# Patient Record
Sex: FEMALE | Race: BLACK OR AFRICAN AMERICAN | NOT HISPANIC OR LATINO
[De-identification: names, ages, dates, MRNs, and addresses within clinical notes are randomized per-mention and may not be internally consistent; named-entity substitution may affect disease eponyms.]

---

## 2017-01-05 ENCOUNTER — APPOINTMENT (OUTPATIENT)
Dept: OBGYN | Facility: CLINIC | Age: 37
End: 2017-01-05

## 2017-01-23 ENCOUNTER — APPOINTMENT (OUTPATIENT)
Dept: OBGYN | Facility: CLINIC | Age: 37
End: 2017-01-23

## 2017-02-16 ENCOUNTER — APPOINTMENT (OUTPATIENT)
Dept: OBGYN | Facility: CLINIC | Age: 37
End: 2017-02-16

## 2017-03-28 ENCOUNTER — RESULT CHARGE (OUTPATIENT)
Age: 37
End: 2017-03-28

## 2017-03-29 ENCOUNTER — NON-APPOINTMENT (OUTPATIENT)
Age: 37
End: 2017-03-29

## 2017-03-29 ENCOUNTER — APPOINTMENT (OUTPATIENT)
Dept: INTERNAL MEDICINE | Facility: CLINIC | Age: 37
End: 2017-03-29

## 2017-03-29 VITALS — SYSTOLIC BLOOD PRESSURE: 130 MMHG | DIASTOLIC BLOOD PRESSURE: 72 MMHG | HEART RATE: 76 BPM

## 2017-03-29 DIAGNOSIS — D25.9 LEIOMYOMA OF UTERUS, UNSPECIFIED: ICD-10-CM

## 2017-03-31 ENCOUNTER — OUTPATIENT (OUTPATIENT)
Dept: OUTPATIENT SERVICES | Facility: HOSPITAL | Age: 37
LOS: 1 days | End: 2017-03-31
Payer: COMMERCIAL

## 2017-03-31 VITALS
TEMPERATURE: 98 F | HEART RATE: 75 BPM | OXYGEN SATURATION: 100 % | HEIGHT: 64 IN | WEIGHT: 189.6 LBS | SYSTOLIC BLOOD PRESSURE: 112 MMHG | RESPIRATION RATE: 12 BRPM | DIASTOLIC BLOOD PRESSURE: 86 MMHG

## 2017-03-31 DIAGNOSIS — D25.0 SUBMUCOUS LEIOMYOMA OF UTERUS: ICD-10-CM

## 2017-03-31 DIAGNOSIS — D17.1 BENIGN LIPOMATOUS NEOPLASM OF SKIN AND SUBCUTANEOUS TISSUE OF TRUNK: Chronic | ICD-10-CM

## 2017-03-31 DIAGNOSIS — Z90.13 ACQUIRED ABSENCE OF BILATERAL BREASTS AND NIPPLES: Chronic | ICD-10-CM

## 2017-03-31 DIAGNOSIS — Z30.2 ENCOUNTER FOR STERILIZATION: Chronic | ICD-10-CM

## 2017-03-31 DIAGNOSIS — Z78.9 OTHER SPECIFIED HEALTH STATUS: ICD-10-CM

## 2017-03-31 PROCEDURE — 85027 COMPLETE CBC AUTOMATED: CPT

## 2017-03-31 RX ORDER — SODIUM CHLORIDE 9 MG/ML
3 INJECTION INTRAMUSCULAR; INTRAVENOUS; SUBCUTANEOUS EVERY 8 HOURS
Qty: 0 | Refills: 0 | Status: DISCONTINUED | OUTPATIENT
Start: 2017-04-06 | End: 2017-04-21

## 2017-03-31 NOTE — H&P PST ADULT - PSH
Encounter for Essure implantation  s/p essure placement  H/O bilateral breast reduction surgery    Lipoma of back  s/p excision

## 2017-03-31 NOTE — H&P PST ADULT - NSANTHOSAYNRD_GEN_A_CORE
No. KATHERINE screening performed.  STOP BANG Legend: 0-2 = LOW Risk; 3-4 = INTERMEDIATE Risk; 5-8 = HIGH Risk

## 2017-03-31 NOTE — H&P PST ADULT - HISTORY OF PRESENT ILLNESS
35 yo female.   presents 37 yo female. . LMP3/15/2017. c/o prolonged menses (up to 2 weeks at a time) and dysmenorrhea. evaluated by GYN, diagnosed with submucous leiomyoma. presents to PST scheduled for hysteroscopic myomectomy.

## 2017-03-31 NOTE — H&P PST ADULT - PROBLEM SELECTOR PLAN 2
left message with surgeon, Dr. Singleton that pt is a Religion, refuses blood transfusion.  OR booking notified

## 2017-04-01 LAB
HCT VFR BLD CALC: 35.4 % — SIGNIFICANT CHANGE UP (ref 34.5–45)
HGB BLD-MCNC: 11.5 G/DL — SIGNIFICANT CHANGE UP (ref 11.5–15.5)
MCHC RBC-ENTMCNC: 27.1 PG — SIGNIFICANT CHANGE UP (ref 27–34)
MCHC RBC-ENTMCNC: 32.5 GM/DL — SIGNIFICANT CHANGE UP (ref 32–36)
MCV RBC AUTO: 83.3 FL — SIGNIFICANT CHANGE UP (ref 80–100)
PLATELET # BLD AUTO: 270 K/UL — SIGNIFICANT CHANGE UP (ref 150–400)
RBC # BLD: 4.25 M/UL — SIGNIFICANT CHANGE UP (ref 3.8–5.2)
RBC # FLD: 14.1 % — SIGNIFICANT CHANGE UP (ref 10.3–14.5)
WBC # BLD: 7.41 K/UL — SIGNIFICANT CHANGE UP (ref 3.8–10.5)
WBC # FLD AUTO: 7.41 K/UL — SIGNIFICANT CHANGE UP (ref 3.8–10.5)

## 2017-04-03 RX ORDER — ACETAMINOPHEN 500 MG
975 TABLET ORAL ONCE
Qty: 0 | Refills: 0 | Status: COMPLETED | OUTPATIENT
Start: 2017-04-06 | End: 2017-04-06

## 2017-04-03 RX ORDER — CELECOXIB 200 MG/1
200 CAPSULE ORAL ONCE
Qty: 0 | Refills: 0 | Status: COMPLETED | OUTPATIENT
Start: 2017-04-06 | End: 2017-04-06

## 2017-04-05 ENCOUNTER — RESULT REVIEW (OUTPATIENT)
Age: 37
End: 2017-04-05

## 2017-04-06 ENCOUNTER — OUTPATIENT (OUTPATIENT)
Dept: OUTPATIENT SERVICES | Facility: HOSPITAL | Age: 37
LOS: 1 days | End: 2017-04-06
Payer: COMMERCIAL

## 2017-04-06 ENCOUNTER — APPOINTMENT (OUTPATIENT)
Dept: OBGYN | Facility: HOSPITAL | Age: 37
End: 2017-04-06

## 2017-04-06 VITALS
SYSTOLIC BLOOD PRESSURE: 117 MMHG | DIASTOLIC BLOOD PRESSURE: 82 MMHG | OXYGEN SATURATION: 100 % | TEMPERATURE: 98 F | HEART RATE: 66 BPM | RESPIRATION RATE: 12 BRPM | HEIGHT: 64 IN | WEIGHT: 189.6 LBS

## 2017-04-06 VITALS
SYSTOLIC BLOOD PRESSURE: 113 MMHG | HEART RATE: 72 BPM | RESPIRATION RATE: 16 BRPM | OXYGEN SATURATION: 100 % | DIASTOLIC BLOOD PRESSURE: 70 MMHG

## 2017-04-06 DIAGNOSIS — Z90.13 ACQUIRED ABSENCE OF BILATERAL BREASTS AND NIPPLES: Chronic | ICD-10-CM

## 2017-04-06 DIAGNOSIS — D25.0 SUBMUCOUS LEIOMYOMA OF UTERUS: ICD-10-CM

## 2017-04-06 DIAGNOSIS — D17.1 BENIGN LIPOMATOUS NEOPLASM OF SKIN AND SUBCUTANEOUS TISSUE OF TRUNK: Chronic | ICD-10-CM

## 2017-04-06 DIAGNOSIS — Z30.2 ENCOUNTER FOR STERILIZATION: Chronic | ICD-10-CM

## 2017-04-06 PROCEDURE — 58561 HYSTEROSCOPY REMOVE MYOMA: CPT | Mod: 82

## 2017-04-06 PROCEDURE — 58561 HYSTEROSCOPY REMOVE MYOMA: CPT

## 2017-04-06 PROCEDURE — 88305 TISSUE EXAM BY PATHOLOGIST: CPT | Mod: 26

## 2017-04-06 PROCEDURE — 88305 TISSUE EXAM BY PATHOLOGIST: CPT

## 2017-04-06 RX ORDER — ONDANSETRON 8 MG/1
4 TABLET, FILM COATED ORAL ONCE
Qty: 0 | Refills: 0 | Status: DISCONTINUED | OUTPATIENT
Start: 2017-04-06 | End: 2017-04-21

## 2017-04-06 RX ORDER — SODIUM CHLORIDE 9 MG/ML
1000 INJECTION INTRAMUSCULAR; INTRAVENOUS; SUBCUTANEOUS
Qty: 0 | Refills: 0 | Status: DISCONTINUED | OUTPATIENT
Start: 2017-04-06 | End: 2017-04-21

## 2017-04-06 RX ORDER — OXYCODONE HYDROCHLORIDE 5 MG/1
5 TABLET ORAL ONCE
Qty: 0 | Refills: 0 | Status: DISCONTINUED | OUTPATIENT
Start: 2017-04-06 | End: 2017-04-06

## 2017-04-06 RX ORDER — CELECOXIB 200 MG/1
200 CAPSULE ORAL ONCE
Qty: 0 | Refills: 0 | Status: COMPLETED | OUTPATIENT
Start: 2017-04-06 | End: 2017-04-06

## 2017-04-06 RX ORDER — OXYCODONE HYDROCHLORIDE 5 MG/1
10 TABLET ORAL ONCE
Qty: 0 | Refills: 0 | Status: DISCONTINUED | OUTPATIENT
Start: 2017-04-06 | End: 2017-04-06

## 2017-04-06 RX ADMIN — CELECOXIB 200 MILLIGRAM(S): 200 CAPSULE ORAL at 15:43

## 2017-04-06 RX ADMIN — CELECOXIB 200 MILLIGRAM(S): 200 CAPSULE ORAL at 12:12

## 2017-04-06 RX ADMIN — Medication 975 MILLIGRAM(S): at 12:12

## 2017-04-06 NOTE — BRIEF OPERATIVE NOTE - PROCEDURE
Dilation and curettage  04/06/2017    Active  [email or web address]  Operative hysteroscopy with bipolar resectoscope with automated tissue fragment removal system  04/06/2017  monopolar  Active  [email or web address]

## 2017-04-07 ENCOUNTER — TRANSCRIPTION ENCOUNTER (OUTPATIENT)
Age: 37
End: 2017-04-07

## 2017-04-13 LAB — SURGICAL PATHOLOGY STUDY: SIGNIFICANT CHANGE UP

## 2017-04-24 ENCOUNTER — APPOINTMENT (OUTPATIENT)
Dept: OBGYN | Facility: CLINIC | Age: 37
End: 2017-04-24

## 2017-04-25 ENCOUNTER — APPOINTMENT (OUTPATIENT)
Dept: OBGYN | Facility: CLINIC | Age: 37
End: 2017-04-25

## 2017-07-22 ENCOUNTER — EMERGENCY (EMERGENCY)
Facility: HOSPITAL | Age: 37
LOS: 1 days | Discharge: HOME | End: 2017-07-22
Attending: EMERGENCY MEDICINE | Admitting: EMERGENCY MEDICINE
Payer: COMMERCIAL

## 2017-07-22 VITALS
HEART RATE: 86 BPM | SYSTOLIC BLOOD PRESSURE: 96 MMHG | DIASTOLIC BLOOD PRESSURE: 61 MMHG | TEMPERATURE: 100 F | RESPIRATION RATE: 20 BRPM | OXYGEN SATURATION: 100 %

## 2017-07-22 DIAGNOSIS — D17.1 BENIGN LIPOMATOUS NEOPLASM OF SKIN AND SUBCUTANEOUS TISSUE OF TRUNK: Chronic | ICD-10-CM

## 2017-07-22 DIAGNOSIS — Z30.2 ENCOUNTER FOR STERILIZATION: Chronic | ICD-10-CM

## 2017-07-22 DIAGNOSIS — Z90.13 ACQUIRED ABSENCE OF BILATERAL BREASTS AND NIPPLES: Chronic | ICD-10-CM

## 2017-07-22 LAB
ALBUMIN SERPL ELPH-MCNC: 3.5 G/DL — SIGNIFICANT CHANGE UP (ref 3.3–5)
ALP SERPL-CCNC: 78 U/L — SIGNIFICANT CHANGE UP (ref 40–120)
ALT FLD-CCNC: 115 U/L RC — HIGH (ref 10–45)
ANION GAP SERPL CALC-SCNC: 16 MMOL/L — SIGNIFICANT CHANGE UP (ref 5–17)
APTT BLD: 27 SEC — LOW (ref 27.5–37.4)
AST SERPL-CCNC: 192 U/L — HIGH (ref 10–40)
BASOPHILS # BLD AUTO: 0.1 K/UL — SIGNIFICANT CHANGE UP (ref 0–0.2)
BILIRUB SERPL-MCNC: 0.4 MG/DL — SIGNIFICANT CHANGE UP (ref 0.2–1.2)
BUN SERPL-MCNC: 11 MG/DL — SIGNIFICANT CHANGE UP (ref 7–23)
CALCIUM SERPL-MCNC: 9 MG/DL — SIGNIFICANT CHANGE UP (ref 8.4–10.5)
CHLORIDE SERPL-SCNC: 103 MMOL/L — SIGNIFICANT CHANGE UP (ref 96–108)
CO2 SERPL-SCNC: 21 MMOL/L — LOW (ref 22–31)
CREAT SERPL-MCNC: 0.82 MG/DL — SIGNIFICANT CHANGE UP (ref 0.5–1.3)
EOSINOPHIL # BLD AUTO: 0 K/UL — SIGNIFICANT CHANGE UP (ref 0–0.5)
GLUCOSE SERPL-MCNC: 134 MG/DL — HIGH (ref 70–99)
HCT VFR BLD CALC: 34.9 % — SIGNIFICANT CHANGE UP (ref 34.5–45)
HGB BLD-MCNC: 11.8 G/DL — SIGNIFICANT CHANGE UP (ref 11.5–15.5)
INR BLD: 1.03 RATIO — SIGNIFICANT CHANGE UP (ref 0.88–1.16)
LIDOCAIN IGE QN: 28 U/L — SIGNIFICANT CHANGE UP (ref 7–60)
LYMPHOCYTES # BLD AUTO: 0.6 K/UL — LOW (ref 1–3.3)
LYMPHOCYTES # BLD AUTO: 6 % — LOW (ref 13–44)
MAGNESIUM SERPL-MCNC: 1.8 MG/DL — SIGNIFICANT CHANGE UP (ref 1.6–2.6)
MCHC RBC-ENTMCNC: 28.7 PG — SIGNIFICANT CHANGE UP (ref 27–34)
MCHC RBC-ENTMCNC: 33.7 GM/DL — SIGNIFICANT CHANGE UP (ref 32–36)
MCV RBC AUTO: 85.2 FL — SIGNIFICANT CHANGE UP (ref 80–100)
MONOCYTES # BLD AUTO: 0.4 K/UL — SIGNIFICANT CHANGE UP (ref 0–0.9)
MONOCYTES NFR BLD AUTO: 4 % — SIGNIFICANT CHANGE UP (ref 2–14)
NEUTROPHILS # BLD AUTO: 10.6 K/UL — HIGH (ref 1.8–7.4)
NEUTROPHILS NFR BLD AUTO: 87 % — HIGH (ref 43–77)
PHOSPHATE SERPL-MCNC: 1.8 MG/DL — LOW (ref 2.5–4.5)
PLATELET # BLD AUTO: 197 K/UL — SIGNIFICANT CHANGE UP (ref 150–400)
POTASSIUM SERPL-MCNC: 4.4 MMOL/L — SIGNIFICANT CHANGE UP (ref 3.5–5.3)
POTASSIUM SERPL-SCNC: 4.4 MMOL/L — SIGNIFICANT CHANGE UP (ref 3.5–5.3)
PROT SERPL-MCNC: 7.4 G/DL — SIGNIFICANT CHANGE UP (ref 6–8.3)
PROTHROM AB SERPL-ACNC: 11.2 SEC — SIGNIFICANT CHANGE UP (ref 9.8–12.7)
RBC # BLD: 4.09 M/UL — SIGNIFICANT CHANGE UP (ref 3.8–5.2)
RBC # FLD: 13.4 % — SIGNIFICANT CHANGE UP (ref 10.3–14.5)
SODIUM SERPL-SCNC: 140 MMOL/L — SIGNIFICANT CHANGE UP (ref 135–145)
WBC # BLD: 11.7 K/UL — HIGH (ref 3.8–10.5)
WBC # FLD AUTO: 11.7 K/UL — HIGH (ref 3.8–10.5)

## 2017-07-22 PROCEDURE — 71010: CPT | Mod: 26

## 2017-07-22 PROCEDURE — 99285 EMERGENCY DEPT VISIT HI MDM: CPT

## 2017-07-22 RX ORDER — MORPHINE SULFATE 50 MG/1
4 CAPSULE, EXTENDED RELEASE ORAL ONCE
Qty: 0 | Refills: 0 | Status: DISCONTINUED | OUTPATIENT
Start: 2017-07-22 | End: 2017-07-22

## 2017-07-22 RX ORDER — FAMOTIDINE 10 MG/ML
20 INJECTION INTRAVENOUS ONCE
Qty: 0 | Refills: 0 | Status: COMPLETED | OUTPATIENT
Start: 2017-07-22 | End: 2017-07-22

## 2017-07-22 RX ORDER — SUCRALFATE 1 G
1 TABLET ORAL ONCE
Qty: 0 | Refills: 0 | Status: COMPLETED | OUTPATIENT
Start: 2017-07-22 | End: 2017-07-22

## 2017-07-22 RX ORDER — ACETAMINOPHEN 500 MG
1000 TABLET ORAL ONCE
Qty: 0 | Refills: 0 | Status: COMPLETED | OUTPATIENT
Start: 2017-07-22 | End: 2017-07-22

## 2017-07-22 RX ORDER — ONDANSETRON 8 MG/1
4 TABLET, FILM COATED ORAL ONCE
Qty: 0 | Refills: 0 | Status: COMPLETED | OUTPATIENT
Start: 2017-07-22 | End: 2017-07-22

## 2017-07-22 RX ORDER — SODIUM CHLORIDE 9 MG/ML
1000 INJECTION INTRAMUSCULAR; INTRAVENOUS; SUBCUTANEOUS ONCE
Qty: 0 | Refills: 0 | Status: COMPLETED | OUTPATIENT
Start: 2017-07-22 | End: 2017-07-22

## 2017-07-22 RX ADMIN — Medication 400 MILLIGRAM(S): at 22:45

## 2017-07-22 RX ADMIN — Medication 1000 MILLIGRAM(S): at 23:50

## 2017-07-22 RX ADMIN — SODIUM CHLORIDE 2000 MILLILITER(S): 9 INJECTION INTRAMUSCULAR; INTRAVENOUS; SUBCUTANEOUS at 22:45

## 2017-07-22 RX ADMIN — Medication 30 MILLILITER(S): at 23:34

## 2017-07-22 RX ADMIN — FAMOTIDINE 20 MILLIGRAM(S): 10 INJECTION INTRAVENOUS at 23:34

## 2017-07-22 RX ADMIN — ONDANSETRON 4 MILLIGRAM(S): 8 TABLET, FILM COATED ORAL at 23:34

## 2017-07-22 RX ADMIN — MORPHINE SULFATE 4 MILLIGRAM(S): 50 CAPSULE, EXTENDED RELEASE ORAL at 23:39

## 2017-07-22 NOTE — ED PROVIDER NOTE - MEDICAL DECISION MAKING DETAILS
Anirudh resident: 37 y/o female with p./w epigastric pain - sudden onset - hx of ulcers many years ago - non drinker - low suspicion for pancreatitis - low suspicion for cholecystitis - FOBT negative - will check labs, CT a/p, urine, and reassess

## 2017-07-22 NOTE — ED PROVIDER NOTE - PROGRESS NOTE DETAILS
patient reports feeling better - CT negative for acute pathology other than fibroids - Us negative for acute anna or cholelithiasis - patient feels well - safe to d/c home [place] FAROOQ: Patient reassessed and results of imaging discussed with her. Patient reassured and reports improvement of pain. Patient to follow up with her PMD in 3 days.

## 2017-07-22 NOTE — ED PROVIDER NOTE - CARE PLAN
Principal Discharge DX:	Unspecified abdominal pain  Instructions for follow-up, activity and diet:	1) Please return to the ED should you have any new or worsening symptoms, worsening pain, develop fever, chills, worsening pain or any concerning symptoms  2) Please follow up with your primary care doctor in 2-3 days.

## 2017-07-22 NOTE — ED PROVIDER NOTE - EYES, MLM
Clear bilaterally, pupils equal, round and reactive to light. No scleral icterus or conjunctival pallor.

## 2017-07-22 NOTE — ED PROVIDER NOTE - GASTROINTESTINAL, MLM
Abdomen soft,, tenderness in epigastric region w/ no guarding. No cva tenderness. Rectal Exam w/ small non bleeding hemorrhoid - no stool in rectal vault w/ minimal mucous, Guaiac negative Chaperone: Kaitlin Tech

## 2017-07-22 NOTE — ED PROVIDER NOTE - CONSTITUTIONAL, MLM
normal... Well appearing, well nourished, awake, alert, oriented to person, place, time/situation and in moderate distress

## 2017-07-22 NOTE — ED ADULT NURSE NOTE - OBJECTIVE STATEMENT
0020 pt 36y f aox3 presented w/ abd pain w/ n/v states had same s/sx last year dx w/ gastritis, has hx ulcer dse, pt pending ct scan, drinking at this time pending labs/ct/dispo/gcruz

## 2017-07-22 NOTE — ED ADULT NURSE NOTE - CARDIO ASSESSMENT
VACCINE ADMINISTRATION RECORD  PARENT / GUARDIAN APPROVAL  Date: 2022  Vaccine administered to: Lynelle Cooks     : 2024    MRN: [id number]    A copy of the appropriate Centers for Disease Control and Prevention Vaccine Information statement has been provided. I have read or have had explained the information about the diseases and the vaccines listed below. There was an opportunity to ask questions and any questions were answered satisfactorily. I believe that I understand the benefits and risks of the vaccine cited and ask that the vaccine(s) listed below be given to me or to the person named above (for whom I am authorized to make this request). VACCINES ADMINISTERED:  Pediarix  , HIB  , Prevnar   and Rotarix     I have read and hereby agree to be bound by the terms of this agreement as stated above. My signature is valid until revoked by me in writing. This document is signed by , relationship: Mother on 2022.:                                                                                                                                         Parent / Joni Dry                                                Date    Lashell Loera served as a witness to authentication that the identity of the person signing electronically is in fact the person represented as signing. This document was generated by Lashell Loera on 2022. WDL

## 2017-07-22 NOTE — ED PROVIDER NOTE - OBJECTIVE STATEMENT
35 y/o female with no PMH on Phentermine for weight loss p/w abdominal pain since Thursday. Per patient, has had epigastric abdominal pain since Thursday. Pain is getting worse. Patient also reports chills. Describes pain sharp, and radiating through to her back. Hx of ulcer 10 years ago diagnosed by endoscopy. States has had pain like this in the past, last episode 1 year ago - diagnosed with gastritis. Not on any active acid suppression - unknown hx of H. pylori. Been taking pepcid with mild relief. No EtOH use. No new meds. Denies any vomiting or diarrhea, but is nauseous. Also has a hx of constipation - last BM today was small pellets. Reports bright red blood on top of stool - been present for past few days. LMP end of June.   PMD: Shira Eaton

## 2017-07-22 NOTE — ED PROVIDER NOTE - PLAN OF CARE
1) Please return to the ED should you have any new or worsening symptoms, worsening pain, develop fever, chills, worsening pain or any concerning symptoms  2) Please follow up with your primary care doctor in 2-3 days.

## 2017-07-23 VITALS
TEMPERATURE: 98 F | HEART RATE: 81 BPM | RESPIRATION RATE: 18 BRPM | OXYGEN SATURATION: 99 % | SYSTOLIC BLOOD PRESSURE: 100 MMHG | DIASTOLIC BLOOD PRESSURE: 65 MMHG

## 2017-07-23 LAB
APPEARANCE UR: ABNORMAL
BACTERIA # UR AUTO: ABNORMAL /HPF
BILIRUB UR-MCNC: NEGATIVE — SIGNIFICANT CHANGE UP
COLOR SPEC: YELLOW — SIGNIFICANT CHANGE UP
DIFF PNL FLD: NEGATIVE — SIGNIFICANT CHANGE UP
EPI CELLS # UR: SIGNIFICANT CHANGE UP /HPF
GLUCOSE UR QL: NEGATIVE — SIGNIFICANT CHANGE UP
KETONES UR-MCNC: NEGATIVE — SIGNIFICANT CHANGE UP
LEUKOCYTE ESTERASE UR-ACNC: NEGATIVE — SIGNIFICANT CHANGE UP
NITRITE UR-MCNC: NEGATIVE — SIGNIFICANT CHANGE UP
PH UR: 6.5 — SIGNIFICANT CHANGE UP (ref 5–8)
PROT UR-MCNC: SIGNIFICANT CHANGE UP
RBC CASTS # UR COMP ASSIST: SIGNIFICANT CHANGE UP /HPF (ref 0–2)
SP GR SPEC: 1.02 — SIGNIFICANT CHANGE UP (ref 1.01–1.02)
UROBILINOGEN FLD QL: NEGATIVE — SIGNIFICANT CHANGE UP
WBC UR QL: SIGNIFICANT CHANGE UP /HPF (ref 0–5)

## 2017-07-23 PROCEDURE — 83605 ASSAY OF LACTIC ACID: CPT

## 2017-07-23 PROCEDURE — 84295 ASSAY OF SERUM SODIUM: CPT

## 2017-07-23 PROCEDURE — 82330 ASSAY OF CALCIUM: CPT

## 2017-07-23 PROCEDURE — 99284 EMERGENCY DEPT VISIT MOD MDM: CPT | Mod: 25

## 2017-07-23 PROCEDURE — 71045 X-RAY EXAM CHEST 1 VIEW: CPT

## 2017-07-23 PROCEDURE — 80053 COMPREHEN METABOLIC PANEL: CPT

## 2017-07-23 PROCEDURE — 84100 ASSAY OF PHOSPHORUS: CPT

## 2017-07-23 PROCEDURE — 82803 BLOOD GASES ANY COMBINATION: CPT

## 2017-07-23 PROCEDURE — 83690 ASSAY OF LIPASE: CPT

## 2017-07-23 PROCEDURE — 83735 ASSAY OF MAGNESIUM: CPT

## 2017-07-23 PROCEDURE — 82272 OCCULT BLD FECES 1-3 TESTS: CPT

## 2017-07-23 PROCEDURE — 76705 ECHO EXAM OF ABDOMEN: CPT | Mod: 26

## 2017-07-23 PROCEDURE — 96374 THER/PROPH/DIAG INJ IV PUSH: CPT | Mod: XU

## 2017-07-23 PROCEDURE — 84132 ASSAY OF SERUM POTASSIUM: CPT

## 2017-07-23 PROCEDURE — 82947 ASSAY GLUCOSE BLOOD QUANT: CPT

## 2017-07-23 PROCEDURE — 82435 ASSAY OF BLOOD CHLORIDE: CPT

## 2017-07-23 PROCEDURE — 76705 ECHO EXAM OF ABDOMEN: CPT

## 2017-07-23 PROCEDURE — 74177 CT ABD & PELVIS W/CONTRAST: CPT

## 2017-07-23 PROCEDURE — 85014 HEMATOCRIT: CPT

## 2017-07-23 PROCEDURE — 85610 PROTHROMBIN TIME: CPT

## 2017-07-23 PROCEDURE — 85730 THROMBOPLASTIN TIME PARTIAL: CPT

## 2017-07-23 PROCEDURE — 81001 URINALYSIS AUTO W/SCOPE: CPT

## 2017-07-23 PROCEDURE — 74177 CT ABD & PELVIS W/CONTRAST: CPT | Mod: 26

## 2017-07-23 PROCEDURE — 96375 TX/PRO/DX INJ NEW DRUG ADDON: CPT

## 2017-07-23 PROCEDURE — 85027 COMPLETE CBC AUTOMATED: CPT

## 2017-07-23 RX ADMIN — Medication 1 GRAM(S): at 03:00

## 2017-07-23 RX ADMIN — MORPHINE SULFATE 4 MILLIGRAM(S): 50 CAPSULE, EXTENDED RELEASE ORAL at 04:29

## 2017-07-23 NOTE — ED ADULT NURSE REASSESSMENT NOTE - NS ED NURSE REASSESS COMMENT FT1
report received from IVON Dos Santos. Pt. in no acute distress, sitting in stretcher comfortably. Breathing unlabored on RA. Comfort and safety maintained.
[id number] pt went for sono at this time/pending results and final dispo/gcruz
3333 pt states pain is controlled, resting comfortably vss/pending sono/gcruz

## 2017-07-24 ENCOUNTER — TRANSCRIPTION ENCOUNTER (OUTPATIENT)
Age: 37
End: 2017-07-24

## 2017-07-31 ENCOUNTER — APPOINTMENT (OUTPATIENT)
Dept: GASTROENTEROLOGY | Facility: CLINIC | Age: 37
End: 2017-07-31
Payer: COMMERCIAL

## 2017-07-31 VITALS
TEMPERATURE: 98.9 F | HEIGHT: 63 IN | WEIGHT: 185 LBS | SYSTOLIC BLOOD PRESSURE: 120 MMHG | BODY MASS INDEX: 32.78 KG/M2 | RESPIRATION RATE: 14 BRPM | DIASTOLIC BLOOD PRESSURE: 80 MMHG | OXYGEN SATURATION: 98 % | HEART RATE: 94 BPM

## 2017-07-31 DIAGNOSIS — R79.89 OTHER SPECIFIED ABNORMAL FINDINGS OF BLOOD CHEMISTRY: ICD-10-CM

## 2017-07-31 PROCEDURE — 99214 OFFICE O/P EST MOD 30 MIN: CPT

## 2017-08-01 LAB
ALBUMIN SERPL ELPH-MCNC: 4.1 G/DL
ALP BLD-CCNC: 74 U/L
ALT SERPL-CCNC: 30 U/L
ANION GAP SERPL CALC-SCNC: 14 MMOL/L
AST SERPL-CCNC: 18 U/L
BILIRUB SERPL-MCNC: <0.2 MG/DL
BUN SERPL-MCNC: 7 MG/DL
CALCIUM SERPL-MCNC: 9.5 MG/DL
CHLORIDE SERPL-SCNC: 102 MMOL/L
CO2 SERPL-SCNC: 23 MMOL/L
CREAT SERPL-MCNC: 0.84 MG/DL
GLUCOSE SERPL-MCNC: 90 MG/DL
HAV IGG+IGM SER QL: REACTIVE
HBV SURFACE AG SER QL: NONREACTIVE
HCV AB SER QL: NONREACTIVE
HCV S/CO RATIO: 0.14 S/CO
POTASSIUM SERPL-SCNC: 4.4 MMOL/L
PROT SERPL-MCNC: 7.8 G/DL
SODIUM SERPL-SCNC: 139 MMOL/L
TTG IGA SER IA-ACNC: <5 UNITS
TTG IGA SER-ACNC: NEGATIVE
TTG IGG SER IA-ACNC: <5 UNITS
TTG IGG SER IA-ACNC: NEGATIVE

## 2017-08-02 LAB
ANA PAT FLD IF-IMP: ABNORMAL
ANA SER IF-ACNC: ABNORMAL
CERULOPLASMIN SERPL-MCNC: 39 MG/DL
ENDOMYSIUM IGA SER QL: NORMAL
ENDOMYSIUM IGA TITR SER: NORMAL
HBV E AB SER QL: NEGATIVE
HBV E AG SER QL: NEGATIVE
MITOCHONDRIA AB SER IF-ACNC: NORMAL
SMOOTH MUSCLE AB SER QL IF: NORMAL

## 2017-08-18 ENCOUNTER — FORM ENCOUNTER (OUTPATIENT)
Age: 37
End: 2017-08-18

## 2017-08-19 ENCOUNTER — APPOINTMENT (OUTPATIENT)
Dept: MRI IMAGING | Facility: CLINIC | Age: 37
End: 2017-08-19
Payer: COMMERCIAL

## 2017-08-19 ENCOUNTER — OUTPATIENT (OUTPATIENT)
Dept: OUTPATIENT SERVICES | Facility: HOSPITAL | Age: 37
LOS: 1 days | End: 2017-08-19
Payer: COMMERCIAL

## 2017-08-19 DIAGNOSIS — Z30.2 ENCOUNTER FOR STERILIZATION: Chronic | ICD-10-CM

## 2017-08-19 DIAGNOSIS — R10.13 EPIGASTRIC PAIN: ICD-10-CM

## 2017-08-19 DIAGNOSIS — D17.1 BENIGN LIPOMATOUS NEOPLASM OF SKIN AND SUBCUTANEOUS TISSUE OF TRUNK: Chronic | ICD-10-CM

## 2017-08-19 DIAGNOSIS — Z90.13 ACQUIRED ABSENCE OF BILATERAL BREASTS AND NIPPLES: Chronic | ICD-10-CM

## 2017-08-19 PROCEDURE — 74183 MRI ABD W/O CNTR FLWD CNTR: CPT

## 2017-08-19 PROCEDURE — 74183 MRI ABD W/O CNTR FLWD CNTR: CPT | Mod: 26

## 2017-09-20 ENCOUNTER — TRANSCRIPTION ENCOUNTER (OUTPATIENT)
Age: 37
End: 2017-09-20

## 2017-10-20 ENCOUNTER — APPOINTMENT (OUTPATIENT)
Dept: GASTROENTEROLOGY | Facility: AMBULATORY MEDICAL SERVICES | Age: 37
End: 2017-10-20
Payer: COMMERCIAL

## 2017-10-20 PROCEDURE — 43239 EGD BIOPSY SINGLE/MULTIPLE: CPT

## 2017-10-20 PROCEDURE — 45378 DIAGNOSTIC COLONOSCOPY: CPT

## 2017-10-25 ENCOUNTER — RESULT REVIEW (OUTPATIENT)
Age: 37
End: 2017-10-25

## 2017-10-25 DIAGNOSIS — B96.81 GASTRITIS, UNSPECIFIED, W/OUT BLEEDING: ICD-10-CM

## 2017-10-25 DIAGNOSIS — K29.70 GASTRITIS, UNSPECIFIED, W/OUT BLEEDING: ICD-10-CM

## 2017-11-25 ENCOUNTER — APPOINTMENT (OUTPATIENT)
Dept: ULTRASOUND IMAGING | Facility: CLINIC | Age: 37
End: 2017-11-25
Payer: COMMERCIAL

## 2017-11-25 ENCOUNTER — OUTPATIENT (OUTPATIENT)
Dept: OUTPATIENT SERVICES | Facility: HOSPITAL | Age: 37
LOS: 1 days | End: 2017-11-25
Payer: COMMERCIAL

## 2017-11-25 DIAGNOSIS — Z00.8 ENCOUNTER FOR OTHER GENERAL EXAMINATION: ICD-10-CM

## 2017-11-25 DIAGNOSIS — D17.1 BENIGN LIPOMATOUS NEOPLASM OF SKIN AND SUBCUTANEOUS TISSUE OF TRUNK: Chronic | ICD-10-CM

## 2017-11-25 DIAGNOSIS — Z90.13 ACQUIRED ABSENCE OF BILATERAL BREASTS AND NIPPLES: Chronic | ICD-10-CM

## 2017-11-25 DIAGNOSIS — Z30.2 ENCOUNTER FOR STERILIZATION: Chronic | ICD-10-CM

## 2017-11-25 PROCEDURE — 76856 US EXAM PELVIC COMPLETE: CPT

## 2017-11-25 PROCEDURE — 76830 TRANSVAGINAL US NON-OB: CPT | Mod: 26

## 2017-11-25 PROCEDURE — 76830 TRANSVAGINAL US NON-OB: CPT

## 2017-11-25 PROCEDURE — 76856 US EXAM PELVIC COMPLETE: CPT | Mod: 26

## 2018-01-30 ENCOUNTER — APPOINTMENT (OUTPATIENT)
Dept: OBGYN | Facility: CLINIC | Age: 38
End: 2018-01-30
Payer: COMMERCIAL

## 2018-01-30 PROCEDURE — 99214 OFFICE O/P EST MOD 30 MIN: CPT

## 2018-04-10 ENCOUNTER — APPOINTMENT (OUTPATIENT)
Dept: INTERNAL MEDICINE | Facility: CLINIC | Age: 38
End: 2018-04-10
Payer: COMMERCIAL

## 2018-04-10 ENCOUNTER — NON-APPOINTMENT (OUTPATIENT)
Age: 38
End: 2018-04-10

## 2018-04-10 DIAGNOSIS — N92.1 EXCESSIVE AND FREQUENT MENSTRUATION WITH IRREGULAR CYCLE: ICD-10-CM

## 2018-04-10 DIAGNOSIS — Z01.818 ENCOUNTER FOR OTHER PREPROCEDURAL EXAMINATION: ICD-10-CM

## 2018-04-10 PROCEDURE — 93000 ELECTROCARDIOGRAM COMPLETE: CPT

## 2018-04-10 PROCEDURE — 99214 OFFICE O/P EST MOD 30 MIN: CPT | Mod: 25

## 2018-04-10 RX ORDER — OMEPRAZOLE 20 MG/1
20 TABLET, DELAYED RELEASE ORAL
Qty: 30 | Refills: 5 | Status: DISCONTINUED | COMMUNITY
Start: 2017-07-31 | End: 2018-04-10

## 2018-04-10 RX ORDER — MELOXICAM 7.5 MG/1
7.5 TABLET ORAL
Qty: 30 | Refills: 0 | Status: DISCONTINUED | COMMUNITY
Start: 2017-09-15

## 2018-04-10 RX ORDER — NAPROXEN 375 MG/1
375 TABLET ORAL
Qty: 20 | Refills: 0 | Status: DISCONTINUED | COMMUNITY
Start: 2017-08-02

## 2018-04-10 RX ORDER — AMOXICILLIN 500 MG/1
500 CAPSULE ORAL TWICE DAILY
Qty: 40 | Refills: 0 | Status: DISCONTINUED | COMMUNITY
Start: 2017-10-25 | End: 2018-04-10

## 2018-04-10 RX ORDER — LINACLOTIDE 290 UG/1
290 CAPSULE, GELATIN COATED ORAL
Qty: 30 | Refills: 5 | Status: DISCONTINUED | COMMUNITY
Start: 2017-07-31 | End: 2018-04-10

## 2018-04-10 RX ORDER — PHENAZOPYRIDINE HYDROCHLORIDE 200 MG/1
200 TABLET ORAL
Qty: 6 | Refills: 0 | Status: DISCONTINUED | COMMUNITY
Start: 2018-03-07

## 2018-04-10 RX ORDER — OMEPRAZOLE 20 MG/1
20 CAPSULE, DELAYED RELEASE ORAL
Qty: 30 | Refills: 0 | Status: DISCONTINUED | COMMUNITY
Start: 2017-07-31

## 2018-04-10 RX ORDER — PHENDIMETRAZINE TARTRATE 35 MG/1
35 TABLET ORAL
Qty: 90 | Refills: 0 | Status: DISCONTINUED | COMMUNITY
Start: 2017-07-14

## 2018-04-10 RX ORDER — CLARITHROMYCIN 500 MG/1
500 TABLET, FILM COATED ORAL TWICE DAILY
Qty: 20 | Refills: 0 | Status: DISCONTINUED | COMMUNITY
Start: 2017-10-25 | End: 2018-04-10

## 2018-04-10 RX ORDER — NITROFURANTOIN (MONOHYDRATE/MACROCRYSTALS) 25; 75 MG/1; MG/1
100 CAPSULE ORAL
Qty: 20 | Refills: 0 | Status: DISCONTINUED | COMMUNITY
Start: 2018-03-07

## 2018-04-10 RX ORDER — OMEPRAZOLE 40 MG/1
40 CAPSULE, DELAYED RELEASE ORAL
Qty: 30 | Refills: 0 | Status: DISCONTINUED | COMMUNITY
Start: 2017-10-25 | End: 2018-04-10

## 2018-04-12 ENCOUNTER — OUTPATIENT (OUTPATIENT)
Dept: OUTPATIENT SERVICES | Facility: HOSPITAL | Age: 38
LOS: 1 days | End: 2018-04-12
Payer: COMMERCIAL

## 2018-04-12 VITALS
WEIGHT: 191.14 LBS | DIASTOLIC BLOOD PRESSURE: 79 MMHG | HEIGHT: 64 IN | RESPIRATION RATE: 15 BRPM | SYSTOLIC BLOOD PRESSURE: 110 MMHG | OXYGEN SATURATION: 99 % | TEMPERATURE: 99 F | HEART RATE: 96 BPM

## 2018-04-12 DIAGNOSIS — Z90.13 ACQUIRED ABSENCE OF BILATERAL BREASTS AND NIPPLES: Chronic | ICD-10-CM

## 2018-04-12 DIAGNOSIS — Z78.9 OTHER SPECIFIED HEALTH STATUS: ICD-10-CM

## 2018-04-12 DIAGNOSIS — D17.1 BENIGN LIPOMATOUS NEOPLASM OF SKIN AND SUBCUTANEOUS TISSUE OF TRUNK: Chronic | ICD-10-CM

## 2018-04-12 DIAGNOSIS — Z01.818 ENCOUNTER FOR OTHER PREPROCEDURAL EXAMINATION: ICD-10-CM

## 2018-04-12 DIAGNOSIS — Z98.890 OTHER SPECIFIED POSTPROCEDURAL STATES: Chronic | ICD-10-CM

## 2018-04-12 DIAGNOSIS — D25.0 SUBMUCOUS LEIOMYOMA OF UTERUS: ICD-10-CM

## 2018-04-12 DIAGNOSIS — D25.9 LEIOMYOMA OF UTERUS, UNSPECIFIED: ICD-10-CM

## 2018-04-12 DIAGNOSIS — Z30.2 ENCOUNTER FOR STERILIZATION: Chronic | ICD-10-CM

## 2018-04-12 LAB
ANION GAP SERPL CALC-SCNC: 11 MMOL/L — SIGNIFICANT CHANGE UP (ref 5–17)
BLD GP AB SCN SERPL QL: NEGATIVE — SIGNIFICANT CHANGE UP
BUN SERPL-MCNC: 6 MG/DL — LOW (ref 7–23)
CALCIUM SERPL-MCNC: 9.1 MG/DL — SIGNIFICANT CHANGE UP (ref 8.4–10.5)
CHLORIDE SERPL-SCNC: 106 MMOL/L — SIGNIFICANT CHANGE UP (ref 96–108)
CO2 SERPL-SCNC: 23 MMOL/L — SIGNIFICANT CHANGE UP (ref 22–31)
CREAT SERPL-MCNC: 0.68 MG/DL — SIGNIFICANT CHANGE UP (ref 0.5–1.3)
GLUCOSE SERPL-MCNC: 99 MG/DL — SIGNIFICANT CHANGE UP (ref 70–99)
HBA1C BLD-MCNC: 5.3 % — SIGNIFICANT CHANGE UP (ref 4–5.6)
HCT VFR BLD CALC: 28.3 % — LOW (ref 34.5–45)
HGB BLD-MCNC: 8.7 G/DL — LOW (ref 11.5–15.5)
MCHC RBC-ENTMCNC: 23.6 PG — LOW (ref 27–34)
MCHC RBC-ENTMCNC: 30.7 GM/DL — LOW (ref 32–36)
MCV RBC AUTO: 76.7 FL — LOW (ref 80–100)
PLATELET # BLD AUTO: 341 K/UL — SIGNIFICANT CHANGE UP (ref 150–400)
POTASSIUM SERPL-MCNC: 3.8 MMOL/L — SIGNIFICANT CHANGE UP (ref 3.5–5.3)
POTASSIUM SERPL-SCNC: 3.8 MMOL/L — SIGNIFICANT CHANGE UP (ref 3.5–5.3)
RBC # BLD: 3.69 M/UL — LOW (ref 3.8–5.2)
RBC # FLD: 16.2 % — HIGH (ref 10.3–14.5)
RH IG SCN BLD-IMP: POSITIVE — SIGNIFICANT CHANGE UP
SODIUM SERPL-SCNC: 140 MMOL/L — SIGNIFICANT CHANGE UP (ref 135–145)
WBC # BLD: 8.97 K/UL — SIGNIFICANT CHANGE UP (ref 3.8–10.5)
WBC # FLD AUTO: 8.97 K/UL — SIGNIFICANT CHANGE UP (ref 3.8–10.5)

## 2018-04-12 PROCEDURE — 85027 COMPLETE CBC AUTOMATED: CPT

## 2018-04-12 PROCEDURE — 86900 BLOOD TYPING SEROLOGIC ABO: CPT

## 2018-04-12 PROCEDURE — 86901 BLOOD TYPING SEROLOGIC RH(D): CPT

## 2018-04-12 PROCEDURE — 87086 URINE CULTURE/COLONY COUNT: CPT

## 2018-04-12 PROCEDURE — 80048 BASIC METABOLIC PNL TOTAL CA: CPT

## 2018-04-12 PROCEDURE — 86850 RBC ANTIBODY SCREEN: CPT

## 2018-04-12 PROCEDURE — 83036 HEMOGLOBIN GLYCOSYLATED A1C: CPT

## 2018-04-12 PROCEDURE — G0463: CPT

## 2018-04-12 RX ORDER — CEFOTETAN DISODIUM 1 G
2 VIAL (EA) INJECTION ONCE
Qty: 0 | Refills: 0 | Status: DISCONTINUED | OUTPATIENT
Start: 2018-04-19 | End: 2018-04-19

## 2018-04-12 RX ORDER — CELECOXIB 200 MG/1
200 CAPSULE ORAL ONCE
Qty: 0 | Refills: 0 | Status: COMPLETED | OUTPATIENT
Start: 2018-04-19 | End: 2018-04-19

## 2018-04-12 RX ORDER — ACETAMINOPHEN 500 MG
975 TABLET ORAL ONCE
Qty: 0 | Refills: 0 | Status: COMPLETED | OUTPATIENT
Start: 2018-04-19 | End: 2018-04-19

## 2018-04-12 RX ORDER — SODIUM CHLORIDE 9 MG/ML
3 INJECTION INTRAMUSCULAR; INTRAVENOUS; SUBCUTANEOUS EVERY 8 HOURS
Qty: 0 | Refills: 0 | Status: DISCONTINUED | OUTPATIENT
Start: 2018-04-19 | End: 2018-04-19

## 2018-04-12 RX ORDER — FAMOTIDINE 10 MG/ML
20 INJECTION INTRAVENOUS ONCE
Qty: 0 | Refills: 0 | Status: COMPLETED | OUTPATIENT
Start: 2018-04-19 | End: 2018-04-19

## 2018-04-12 RX ORDER — LIDOCAINE HCL 20 MG/ML
0.2 VIAL (ML) INJECTION ONCE
Qty: 0 | Refills: 0 | Status: DISCONTINUED | OUTPATIENT
Start: 2018-04-19 | End: 2018-04-19

## 2018-04-12 NOTE — H&P PST ADULT - PROBLEM SELECTOR PLAN 1
Scheduled laparoscopic supracervical hysterectomy, bilateral salpingectomy, possible exploratory laparotomy and cystoscopy on 4/19/2018  CBC, BMP, HgA1c, T&S, UCx sent at [facility]  Pt reports that she accepts all blood products at this time- does not want to fill out a blood refusal consent- she advised that she has not  been baptized as a JW  Pre-op instructions given to pt

## 2018-04-12 NOTE — H&P PST ADULT - NSANTHTIREDRD_ENT_A_CORE
Came in from home cc high blood sugar was 500 at 0300 and the meter registered high, generalized weakness . Pt is insulin dependent DM not taking his long acting for the past 3 days   
No

## 2018-04-12 NOTE — H&P PST ADULT - PMH
Patient is Church    Submucous leiomyoma of uterus Patient is Caodaism  not completely baptized, will accept blood products at this time  Submucous leiomyoma of uterus Lumbar disc herniation  physical therapy  Patient is [employer]ism  not completely baptized, will accept blood products at this time, patient doesn't have health care proxy  Submucous leiomyoma of uterus H pylori ulcer    Lumbar disc herniation  physical therapy  Patient is Muslim  not completely baptized, will accept blood products at this time, patient doesn't have health care proxy  Submucous leiomyoma of uterus

## 2018-04-12 NOTE — H&P PST ADULT - ATTENDING COMMENTS
Plan is for laparoscopic supracervical hysterectomy, bilateral salpingectomy, possible laparotomy, possible cystoscopy.

## 2018-04-12 NOTE — H&P PST ADULT - PSH
Encounter for Essure implantation  s/p essure placement  H/O bilateral breast reduction surgery    H/O myomectomy  hysteroscopic, 4/2017  Lipoma of back  s/p excision Encounter for Essure implantation  s/p essure placement, over 12 years ago  H/O bilateral breast reduction surgery    H/O myomectomy  hysteroscopic, 4/2017  Lipoma of back  s/p excision Encounter for Essure implantation  s/p Essure placement, over 12 years ago  H/O bilateral breast reduction surgery    H/O myomectomy  hysteroscopic, 4/2017  Lipoma of back  s/p excision

## 2018-04-12 NOTE — H&P PST ADULT - ACTIVITY
gym 3-4x/week- cardio, aerobic gym 3-4x/week- cardio, aerobic, able to participate in moderate activity

## 2018-04-12 NOTE — H&P PST ADULT - HISTORY OF PRESENT ILLNESS
35 yo female. . LMP3/15/2017. c/o prolonged menses (up to 2 weeks at a time) and dysmenorrhea. evaluated by GYN, diagnosed with submucous leiomyoma. presents to PST scheduled for hysteroscopic myomectomy. 36 y/o AA female. . LMP3/ s/p hysteroscopic myomectomy 2017 still c/o prolonged menses (up to 2 weeks at a time), and continued menorrhagia and dysmenorrhea. Re-evaluated by GYN, diagnosed with submucous leiomyomas. Presents to [facility] for a scheduled laparoscopic supracervical hysterectomy, bilateral salpingectomy, possible exploratory laparotomy and cystoscopy on 2018. 38 y/o AA female. . LMP3/ s/p hysteroscopic myomectomy 2017 still c/o prolonged menses (up to 2 weeks at a time), and continued menorrhagia and dysmenorrhea. Re-evaluated by GYN, diagnosed with submucous leiomyomas. Presents to PST for a scheduled laparoscopic supracervical hysterectomy, bilateral salpingectomy, possible exploratory laparotomy and cystoscopy on 2018.      ***Currently pt accepts all blood products- does not have JW health care proxy***

## 2018-04-12 NOTE — H&P PST ADULT - PRIMARY CARE PROVIDER
DR. Eaton  Dr. Eaton ([facility]) [phone] Dr. Shira Eaton ([facility]) [phone], appt 4/10/18

## 2018-04-13 LAB
CULTURE RESULTS: SIGNIFICANT CHANGE UP
SPECIMEN SOURCE: SIGNIFICANT CHANGE UP

## 2018-04-18 ENCOUNTER — TRANSCRIPTION ENCOUNTER (OUTPATIENT)
Age: 38
End: 2018-04-18

## 2018-04-19 ENCOUNTER — OUTPATIENT (OUTPATIENT)
Dept: INPATIENT UNIT | Facility: HOSPITAL | Age: 38
LOS: 1 days | Discharge: HOME | End: 2018-04-19
Payer: COMMERCIAL

## 2018-04-19 ENCOUNTER — RESULT REVIEW (OUTPATIENT)
Age: 38
End: 2018-04-19

## 2018-04-19 ENCOUNTER — APPOINTMENT (OUTPATIENT)
Dept: OBGYN | Facility: HOSPITAL | Age: 38
End: 2018-04-19

## 2018-04-19 VITALS
RESPIRATION RATE: 20 BRPM | SYSTOLIC BLOOD PRESSURE: 116 MMHG | TEMPERATURE: 99 F | DIASTOLIC BLOOD PRESSURE: 79 MMHG | OXYGEN SATURATION: 100 % | HEART RATE: 89 BPM | WEIGHT: 191.14 LBS | HEIGHT: 64 IN

## 2018-04-19 VITALS
HEART RATE: 80 BPM | DIASTOLIC BLOOD PRESSURE: 76 MMHG | SYSTOLIC BLOOD PRESSURE: 124 MMHG | RESPIRATION RATE: 16 BRPM | TEMPERATURE: 98 F | OXYGEN SATURATION: 100 %

## 2018-04-19 DIAGNOSIS — D25.9 LEIOMYOMA OF UTERUS, UNSPECIFIED: ICD-10-CM

## 2018-04-19 DIAGNOSIS — Z90.13 ACQUIRED ABSENCE OF BILATERAL BREASTS AND NIPPLES: Chronic | ICD-10-CM

## 2018-04-19 DIAGNOSIS — Z98.890 OTHER SPECIFIED POSTPROCEDURAL STATES: Chronic | ICD-10-CM

## 2018-04-19 DIAGNOSIS — D17.1 BENIGN LIPOMATOUS NEOPLASM OF SKIN AND SUBCUTANEOUS TISSUE OF TRUNK: Chronic | ICD-10-CM

## 2018-04-19 DIAGNOSIS — Z30.2 ENCOUNTER FOR STERILIZATION: Chronic | ICD-10-CM

## 2018-04-19 LAB
BASE EXCESS BLDV CALC-SCNC: 0.5 MMOL/L — SIGNIFICANT CHANGE UP (ref -2–2)
BLOOD GAS VENOUS - CREATININE: SIGNIFICANT CHANGE UP MG/DL (ref 0.5–1.3)
CA-I SERPL-SCNC: 1.22 MMOL/L — SIGNIFICANT CHANGE UP (ref 1.12–1.3)
CHLORIDE BLDV-SCNC: SIGNIFICANT CHANGE UP MMOL/L (ref 96–108)
GAS PNL BLDV: 138 MMOL/L — SIGNIFICANT CHANGE UP (ref 136–145)
GAS PNL BLDV: SIGNIFICANT CHANGE UP
GAS PNL BLDV: SIGNIFICANT CHANGE UP
GLUCOSE BLDC GLUCOMTR-MCNC: 91 MG/DL — SIGNIFICANT CHANGE UP (ref 70–99)
GLUCOSE BLDV-MCNC: 89 MG/DL — SIGNIFICANT CHANGE UP (ref 70–99)
HCG UR QL: NEGATIVE — SIGNIFICANT CHANGE UP
HCO3 BLDV-SCNC: 26 MMOL/L — SIGNIFICANT CHANGE UP (ref 21–29)
HCT VFR BLDA CALC: 28 % — LOW (ref 39–50)
HGB BLD CALC-MCNC: 8.9 G/DL — LOW (ref 11.5–15.5)
HOROWITZ INDEX BLDV+IHG-RTO: 0 — SIGNIFICANT CHANGE UP
LACTATE BLDV-MCNC: 1.3 MMOL/L — SIGNIFICANT CHANGE UP (ref 0.7–2)
PCO2 BLDV: 47 MMHG — SIGNIFICANT CHANGE UP (ref 35–50)
PH BLDV: 7.36 — SIGNIFICANT CHANGE UP (ref 7.35–7.45)
PO2 BLDV: 50 MMHG — HIGH (ref 25–45)
POTASSIUM BLDV-SCNC: 3.8 MMOL/L — SIGNIFICANT CHANGE UP (ref 3.5–5)
SAO2 % BLDV: 81 % — SIGNIFICANT CHANGE UP (ref 67–88)

## 2018-04-19 PROCEDURE — 82803 BLOOD GASES ANY COMBINATION: CPT

## 2018-04-19 PROCEDURE — 88307 TISSUE EXAM BY PATHOLOGIST: CPT | Mod: 26

## 2018-04-19 PROCEDURE — 88302 TISSUE EXAM BY PATHOLOGIST: CPT | Mod: 26

## 2018-04-19 PROCEDURE — 82565 ASSAY OF CREATININE: CPT

## 2018-04-19 PROCEDURE — 85014 HEMATOCRIT: CPT

## 2018-04-19 PROCEDURE — 81025 URINE PREGNANCY TEST: CPT

## 2018-04-19 PROCEDURE — 83605 ASSAY OF LACTIC ACID: CPT

## 2018-04-19 PROCEDURE — 84295 ASSAY OF SERUM SODIUM: CPT

## 2018-04-19 PROCEDURE — 82947 ASSAY GLUCOSE BLOOD QUANT: CPT

## 2018-04-19 PROCEDURE — 88307 TISSUE EXAM BY PATHOLOGIST: CPT

## 2018-04-19 PROCEDURE — 58544 LSH W/T/O UTERUS ABOVE 250 G: CPT

## 2018-04-19 PROCEDURE — 82962 GLUCOSE BLOOD TEST: CPT

## 2018-04-19 PROCEDURE — C1889: CPT

## 2018-04-19 PROCEDURE — 84132 ASSAY OF SERUM POTASSIUM: CPT

## 2018-04-19 PROCEDURE — 88302 TISSUE EXAM BY PATHOLOGIST: CPT

## 2018-04-19 PROCEDURE — 82330 ASSAY OF CALCIUM: CPT

## 2018-04-19 PROCEDURE — 82435 ASSAY OF BLOOD CHLORIDE: CPT

## 2018-04-19 RX ORDER — ACETAMINOPHEN 500 MG
650 TABLET ORAL EVERY 6 HOURS
Qty: 0 | Refills: 0 | Status: DISCONTINUED | OUTPATIENT
Start: 2018-04-19 | End: 2018-04-19

## 2018-04-19 RX ORDER — OXYCODONE HYDROCHLORIDE 5 MG/1
1 TABLET ORAL
Qty: 15 | Refills: 0
Start: 2018-04-19

## 2018-04-19 RX ORDER — IBUPROFEN 200 MG
600 TABLET ORAL EVERY 6 HOURS
Qty: 0 | Refills: 0 | Status: DISCONTINUED | OUTPATIENT
Start: 2018-04-19 | End: 2018-04-19

## 2018-04-19 RX ORDER — HYDROMORPHONE HYDROCHLORIDE 2 MG/ML
0.5 INJECTION INTRAMUSCULAR; INTRAVENOUS; SUBCUTANEOUS
Qty: 0 | Refills: 0 | Status: DISCONTINUED | OUTPATIENT
Start: 2018-04-19 | End: 2018-04-19

## 2018-04-19 RX ORDER — OXYCODONE HYDROCHLORIDE 5 MG/1
5 TABLET ORAL EVERY 4 HOURS
Qty: 0 | Refills: 0 | Status: DISCONTINUED | OUTPATIENT
Start: 2018-04-19 | End: 2018-04-19

## 2018-04-19 RX ORDER — SODIUM CHLORIDE 9 MG/ML
1000 INJECTION, SOLUTION INTRAVENOUS
Qty: 0 | Refills: 0 | Status: DISCONTINUED | OUTPATIENT
Start: 2018-04-19 | End: 2018-04-19

## 2018-04-19 RX ORDER — IBUPROFEN 200 MG
1 TABLET ORAL
Qty: 0 | Refills: 0 | COMMUNITY

## 2018-04-19 RX ORDER — ONDANSETRON 8 MG/1
4 TABLET, FILM COATED ORAL ONCE
Qty: 0 | Refills: 0 | Status: DISCONTINUED | OUTPATIENT
Start: 2018-04-19 | End: 2018-04-19

## 2018-04-19 RX ADMIN — Medication 600 MILLIGRAM(S): at 18:03

## 2018-04-19 RX ADMIN — SODIUM CHLORIDE 125 MILLILITER(S): 9 INJECTION, SOLUTION INTRAVENOUS at 16:46

## 2018-04-19 RX ADMIN — Medication 975 MILLIGRAM(S): at 11:27

## 2018-04-19 RX ADMIN — HYDROMORPHONE HYDROCHLORIDE 0.5 MILLIGRAM(S): 2 INJECTION INTRAMUSCULAR; INTRAVENOUS; SUBCUTANEOUS at 17:00

## 2018-04-19 RX ADMIN — FAMOTIDINE 20 MILLIGRAM(S): 10 INJECTION INTRAVENOUS at 11:27

## 2018-04-19 RX ADMIN — HYDROMORPHONE HYDROCHLORIDE 0.5 MILLIGRAM(S): 2 INJECTION INTRAMUSCULAR; INTRAVENOUS; SUBCUTANEOUS at 16:45

## 2018-04-19 RX ADMIN — HYDROMORPHONE HYDROCHLORIDE 0.5 MILLIGRAM(S): 2 INJECTION INTRAMUSCULAR; INTRAVENOUS; SUBCUTANEOUS at 15:50

## 2018-04-19 RX ADMIN — Medication 650 MILLIGRAM(S): at 18:03

## 2018-04-19 RX ADMIN — SODIUM CHLORIDE 3 MILLILITER(S): 9 INJECTION INTRAMUSCULAR; INTRAVENOUS; SUBCUTANEOUS at 11:30

## 2018-04-19 RX ADMIN — Medication 650 MILLIGRAM(S): at 19:00

## 2018-04-19 RX ADMIN — CELECOXIB 200 MILLIGRAM(S): 200 CAPSULE ORAL at 11:27

## 2018-04-19 RX ADMIN — Medication 600 MILLIGRAM(S): at 19:00

## 2018-04-19 RX ADMIN — HYDROMORPHONE HYDROCHLORIDE 0.5 MILLIGRAM(S): 2 INJECTION INTRAMUSCULAR; INTRAVENOUS; SUBCUTANEOUS at 16:00

## 2018-04-19 NOTE — PATIENT PROFILE ADULT. - PSH
Encounter for Essure implantation  s/p Essure placement, over 12 years ago  H/O bilateral breast reduction surgery    H/O myomectomy  hysteroscopic, 4/2017  Lipoma of back  s/p excision

## 2018-04-19 NOTE — PROGRESS NOTE ADULT - ASSESSMENT
A/P: 37y Female PMH fibroid uterus now POD 0 s/p laparoscopi supracervical hysterectomy and bilateral salpingectomy- doing well

## 2018-04-19 NOTE — ASU DISCHARGE PLAN (ADULT/PEDIATRIC). - MEDICATION SUMMARY - MEDICATIONS TO TAKE
I will START or STAY ON the medications listed below when I get home from the hospital:    oxyCODONE 5 mg oral tablet  -- 1 tab(s) by mouth every 6 hours, As Needed -for severe pain MDD:4  -- Caution federal law prohibits the transfer of this drug to any person other  than the person for whom it was prescribed.  It is very important that you take or use this exactly as directed.  Do not skip doses or discontinue unless directed by your doctor.  May cause drowsiness.  Alcohol may intensify this effect.  Use care when operating dangerous machinery.  This prescription cannot be refilled.  Using more of this medication than prescribed may cause serious breathing problems.    -- Indication: For pain    Tylenol 325 mg oral tablet  -- 2 tab(s) by mouth every 4 hours  -- Indication: For pain

## 2018-04-19 NOTE — PROGRESS NOTE ADULT - PROBLEM SELECTOR PLAN 1
1. Neuro: Analgesia PRN. acetaminophen   Tablet. 650 milliGRAM(s) Oral every 6 hours  HYDROmorphone  Injectable 0.5 milliGRAM(s) IV Push every 10 minutes PRN  ibuprofen  Tablet 600 milliGRAM(s) Oral every 6 hours  ondansetron Injectable 4 milliGRAM(s) IV Push once PRN  oxyCODONE    IR 5 milliGRAM(s) Oral every 4 hours PRN   2. Card: Monitor VS.   3. Pulm: Incentive spirometer use. Continue   4. GI: Advance to regular diet. Anti-emetics PRN.  5. : UO adequate. DTV by 11p  6. Electrolytes: LR@125cc/hr.  7. DVT ppx w/ PAS while in bed. Early ambulation, initially with assistance then as tolerated.  8. Discharge home from PACU when criteria met.   d/w GYN team.

## 2018-04-19 NOTE — PROGRESS NOTE ADULT - SUBJECTIVE AND OBJECTIVE BOX
POST-OP CHECK    Allergies: No Known Allergies    S: Pt awake and alert resting comfortably in chair. Pain controlled. Pt denies N/V, SOB, CP, palpitations. Tolerates clears.  Not OOB yet.    O:   T(C): 36.8 (04-19-18 @ 15:20), Max: 36.8 (04-19-18 @ 15:20)  HR: 85 (04-19-18 @ 17:30) (80 - 94)  BP: 128/76 (04-19-18 @ 17:30) (116/70 - 131/73)  RR: 15 (04-19-18 @ 17:00) (15 - 16)  SpO2: 98% (04-19-18 @ 17:30) (96% - 100%)  Wt(kg): --  I&O's Summary    19 Apr 2018 07:01  -  19 Apr 2018 17:51  --------------------------------------------------------  IN: 250 mL / OUT: 0 mL / NET: 250 mL        CV: S1S2, RRR  Lungs: CTA B/L  Abd: soft, appropriately tender, occasional BS x 4 quadrants  Inc: Clean/dry/intact  Ext: Neg Homans B/L    Brief Op note:  Pre-Op Diagnosis:  Abnormal uterine bleeding (AUB)  04/19/2018    Angie Bullock    Post-Op Dx:  Abnormal uterine bleeding (AUB)  04/19/2018    Angie Bullock    Procedure:   Procedure:  Laparoscopic supracervical hysterectomy with bilateral salpingectomy  04/19/2018    Active  ODETTE.      Operative Findings:  · Operative Findings	grossly normal fallopian tubes and ovaries. enlarged uterus, adenomyosis approx 12 weeks size	    Specimens/Blood Loss/IV/Output/Protocol/VTE:   Specimens/Blood Loss/IV/Output/Protocol/VTE:  · Specimens	uterus and fallopian tubes	  · Estimated Blood Loss	25 milliLiter(s)	  · IV Infusions - Crystalloids	1000	  · Urine Output	125 milliLiter(s)

## 2018-04-19 NOTE — PATIENT PROFILE ADULT. - PMH
H pylori ulcer    Lumbar disc herniation  physical therapy  Patient is Synagogue  not completely baptized, will accept blood products at this time, patient doesn't have health care proxy  Submucous leiomyoma of uterus

## 2018-04-19 NOTE — BRIEF OPERATIVE NOTE - PROCEDURE
<<-----Click on this checkbox to enter Procedure Laparoscopic supracervical hysterectomy with bilateral salpingectomy  04/19/2018    Active  ODETTE

## 2018-04-23 DIAGNOSIS — D25.0 SUBMUCOUS LEIOMYOMA OF UTERUS: ICD-10-CM

## 2018-04-23 DIAGNOSIS — N93.9 ABNORMAL UTERINE AND VAGINAL BLEEDING, UNSPECIFIED: ICD-10-CM

## 2018-04-25 LAB — SURGICAL PATHOLOGY STUDY: SIGNIFICANT CHANGE UP

## 2018-05-04 ENCOUNTER — APPOINTMENT (OUTPATIENT)
Dept: OBGYN | Facility: CLINIC | Age: 38
End: 2018-05-04
Payer: COMMERCIAL

## 2018-05-04 PROCEDURE — 99024 POSTOP FOLLOW-UP VISIT: CPT

## 2018-07-16 PROBLEM — Z78.9 OTHER SPECIFIED HEALTH STATUS: Chronic | Status: ACTIVE | Noted: 2017-03-31

## 2018-07-25 ENCOUNTER — EMERGENCY (EMERGENCY)
Facility: HOSPITAL | Age: 38
LOS: 1 days | Discharge: HOME | End: 2018-07-25
Attending: EMERGENCY MEDICINE
Payer: COMMERCIAL

## 2018-07-25 VITALS
OXYGEN SATURATION: 100 % | SYSTOLIC BLOOD PRESSURE: 146 MMHG | TEMPERATURE: 98 F | HEART RATE: 75 BPM | DIASTOLIC BLOOD PRESSURE: 93 MMHG | RESPIRATION RATE: 18 BRPM

## 2018-07-25 DIAGNOSIS — Z90.13 ACQUIRED ABSENCE OF BILATERAL BREASTS AND NIPPLES: Chronic | ICD-10-CM

## 2018-07-25 DIAGNOSIS — D17.1 BENIGN LIPOMATOUS NEOPLASM OF SKIN AND SUBCUTANEOUS TISSUE OF TRUNK: Chronic | ICD-10-CM

## 2018-07-25 DIAGNOSIS — Z30.2 ENCOUNTER FOR STERILIZATION: Chronic | ICD-10-CM

## 2018-07-25 DIAGNOSIS — Z98.890 OTHER SPECIFIED POSTPROCEDURAL STATES: Chronic | ICD-10-CM

## 2018-07-25 PROCEDURE — 99284 EMERGENCY DEPT VISIT MOD MDM: CPT | Mod: 25

## 2018-07-25 PROCEDURE — 93010 ELECTROCARDIOGRAM REPORT: CPT

## 2018-07-26 VITALS
HEART RATE: 75 BPM | DIASTOLIC BLOOD PRESSURE: 64 MMHG | TEMPERATURE: 98 F | RESPIRATION RATE: 18 BRPM | OXYGEN SATURATION: 100 % | SYSTOLIC BLOOD PRESSURE: 98 MMHG

## 2018-07-26 PROBLEM — M51.26 OTHER INTERVERTEBRAL DISC DISPLACEMENT, LUMBAR REGION: Chronic | Status: ACTIVE | Noted: 2018-04-12

## 2018-07-26 PROBLEM — D25.0 SUBMUCOUS LEIOMYOMA OF UTERUS: Chronic | Status: ACTIVE | Noted: 2017-03-31

## 2018-07-26 PROBLEM — K27.9 PEPTIC ULCER, SITE UNSPECIFIED, UNSPECIFIED AS ACUTE OR CHRONIC, WITHOUT HEMORRHAGE OR PERFORATION: Chronic | Status: ACTIVE | Noted: 2018-04-12

## 2018-07-26 LAB
ALBUMIN SERPL ELPH-MCNC: 4.5 G/DL — SIGNIFICANT CHANGE UP (ref 3.3–5)
ALP SERPL-CCNC: 80 U/L — SIGNIFICANT CHANGE UP (ref 40–120)
ALT FLD-CCNC: 17 U/L — SIGNIFICANT CHANGE UP (ref 10–45)
ANION GAP SERPL CALC-SCNC: 14 MMOL/L — SIGNIFICANT CHANGE UP (ref 5–17)
APPEARANCE UR: CLEAR — SIGNIFICANT CHANGE UP
APTT BLD: 30.4 SEC — SIGNIFICANT CHANGE UP (ref 27.5–37.4)
AST SERPL-CCNC: 18 U/L — SIGNIFICANT CHANGE UP (ref 10–40)
BASE EXCESS BLDV CALC-SCNC: 0.3 MMOL/L — SIGNIFICANT CHANGE UP (ref -2–2)
BASE EXCESS BLDV CALC-SCNC: 1.5 MMOL/L — SIGNIFICANT CHANGE UP (ref -2–2)
BASOPHILS # BLD AUTO: 0 K/UL — SIGNIFICANT CHANGE UP (ref 0–0.2)
BILIRUB SERPL-MCNC: 0.2 MG/DL — SIGNIFICANT CHANGE UP (ref 0.2–1.2)
BILIRUB UR-MCNC: NEGATIVE — SIGNIFICANT CHANGE UP
BUN SERPL-MCNC: 8 MG/DL — SIGNIFICANT CHANGE UP (ref 7–23)
CA-I SERPL-SCNC: 1.18 MMOL/L — SIGNIFICANT CHANGE UP (ref 1.12–1.3)
CA-I SERPL-SCNC: 1.19 MMOL/L — SIGNIFICANT CHANGE UP (ref 1.12–1.3)
CALCIUM SERPL-MCNC: 9.5 MG/DL — SIGNIFICANT CHANGE UP (ref 8.4–10.5)
CHLORIDE BLDV-SCNC: 106 MMOL/L — SIGNIFICANT CHANGE UP (ref 96–108)
CHLORIDE BLDV-SCNC: 110 MMOL/L — HIGH (ref 96–108)
CHLORIDE SERPL-SCNC: 100 MMOL/L — SIGNIFICANT CHANGE UP (ref 96–108)
CO2 BLDV-SCNC: 26 MMOL/L — SIGNIFICANT CHANGE UP (ref 22–30)
CO2 BLDV-SCNC: 28 MMOL/L — SIGNIFICANT CHANGE UP (ref 22–30)
CO2 SERPL-SCNC: 25 MMOL/L — SIGNIFICANT CHANGE UP (ref 22–31)
COLOR SPEC: YELLOW — SIGNIFICANT CHANGE UP
CREAT SERPL-MCNC: 0.76 MG/DL — SIGNIFICANT CHANGE UP (ref 0.5–1.3)
DIFF PNL FLD: NEGATIVE — SIGNIFICANT CHANGE UP
EOSINOPHIL # BLD AUTO: 0 K/UL — SIGNIFICANT CHANGE UP (ref 0–0.5)
GAS PNL BLDV: 135 MMOL/L — LOW (ref 136–145)
GAS PNL BLDV: 136 MMOL/L — SIGNIFICANT CHANGE UP (ref 136–145)
GAS PNL BLDV: SIGNIFICANT CHANGE UP
GLUCOSE BLDV-MCNC: 107 MG/DL — HIGH (ref 70–99)
GLUCOSE BLDV-MCNC: 117 MG/DL — HIGH (ref 70–99)
GLUCOSE SERPL-MCNC: 120 MG/DL — HIGH (ref 70–99)
GLUCOSE UR QL: NEGATIVE — SIGNIFICANT CHANGE UP
HCO3 BLDV-SCNC: 25 MMOL/L — SIGNIFICANT CHANGE UP (ref 21–29)
HCO3 BLDV-SCNC: 27 MMOL/L — SIGNIFICANT CHANGE UP (ref 21–29)
HCT VFR BLD CALC: 38.6 % — SIGNIFICANT CHANGE UP (ref 34.5–45)
HCT VFR BLDA CALC: 31 % — LOW (ref 39–50)
HCT VFR BLDA CALC: 32 % — LOW (ref 39–50)
HGB BLD CALC-MCNC: 10.4 G/DL — LOW (ref 11.5–15.5)
HGB BLD CALC-MCNC: 9.9 G/DL — LOW (ref 11.5–15.5)
HGB BLD-MCNC: 11.3 G/DL — LOW (ref 11.5–15.5)
INR BLD: 1.08 RATIO — SIGNIFICANT CHANGE UP (ref 0.88–1.16)
KETONES UR-MCNC: NEGATIVE — SIGNIFICANT CHANGE UP
LACTATE BLDV-MCNC: 1.7 MMOL/L — SIGNIFICANT CHANGE UP (ref 0.7–2)
LACTATE BLDV-MCNC: 2.2 MMOL/L — HIGH (ref 0.7–2)
LEUKOCYTE ESTERASE UR-ACNC: NEGATIVE — SIGNIFICANT CHANGE UP
LYMPHOCYTES # BLD AUTO: 1.6 K/UL — SIGNIFICANT CHANGE UP (ref 1–3.3)
LYMPHOCYTES # BLD AUTO: 13 % — SIGNIFICANT CHANGE UP (ref 13–44)
MCHC RBC-ENTMCNC: 21.2 PG — LOW (ref 27–34)
MCHC RBC-ENTMCNC: 29.3 GM/DL — LOW (ref 32–36)
MCV RBC AUTO: 72.2 FL — LOW (ref 80–100)
MONOCYTES # BLD AUTO: 0.3 K/UL — SIGNIFICANT CHANGE UP (ref 0–0.9)
MONOCYTES NFR BLD AUTO: 4 % — SIGNIFICANT CHANGE UP (ref 2–14)
NEUTROPHILS # BLD AUTO: 7 K/UL — SIGNIFICANT CHANGE UP (ref 1.8–7.4)
NEUTROPHILS NFR BLD AUTO: 82 % — HIGH (ref 43–77)
NITRITE UR-MCNC: NEGATIVE — SIGNIFICANT CHANGE UP
PCO2 BLDV: 43 MMHG — SIGNIFICANT CHANGE UP (ref 35–50)
PCO2 BLDV: 49 MMHG — SIGNIFICANT CHANGE UP (ref 35–50)
PH BLDV: 7.36 — SIGNIFICANT CHANGE UP (ref 7.35–7.45)
PH BLDV: 7.38 — SIGNIFICANT CHANGE UP (ref 7.35–7.45)
PH UR: 6 — SIGNIFICANT CHANGE UP (ref 5–8)
PLAT MORPH BLD: NORMAL — SIGNIFICANT CHANGE UP
PLATELET # BLD AUTO: 297 K/UL — SIGNIFICANT CHANGE UP (ref 150–400)
PO2 BLDV: 26 MMHG — SIGNIFICANT CHANGE UP (ref 25–45)
PO2 BLDV: 41 MMHG — SIGNIFICANT CHANGE UP (ref 25–45)
POTASSIUM BLDV-SCNC: 3.5 MMOL/L — SIGNIFICANT CHANGE UP (ref 3.5–5.3)
POTASSIUM BLDV-SCNC: 3.6 MMOL/L — SIGNIFICANT CHANGE UP (ref 3.5–5.3)
POTASSIUM SERPL-MCNC: 3.6 MMOL/L — SIGNIFICANT CHANGE UP (ref 3.5–5.3)
POTASSIUM SERPL-SCNC: 3.6 MMOL/L — SIGNIFICANT CHANGE UP (ref 3.5–5.3)
PROT SERPL-MCNC: 8.3 G/DL — SIGNIFICANT CHANGE UP (ref 6–8.3)
PROT UR-MCNC: NEGATIVE — SIGNIFICANT CHANGE UP
PROTHROM AB SERPL-ACNC: 11.7 SEC — SIGNIFICANT CHANGE UP (ref 9.8–12.7)
RBC # BLD: 5.35 M/UL — HIGH (ref 3.8–5.2)
RBC # FLD: 18.6 % — HIGH (ref 10.3–14.5)
RBC BLD AUTO: SIGNIFICANT CHANGE UP
SAO2 % BLDV: 36 % — LOW (ref 67–88)
SAO2 % BLDV: 71 % — SIGNIFICANT CHANGE UP (ref 67–88)
SODIUM SERPL-SCNC: 139 MMOL/L — SIGNIFICANT CHANGE UP (ref 135–145)
SP GR SPEC: 1.01 — LOW (ref 1.01–1.02)
UROBILINOGEN FLD QL: NEGATIVE — SIGNIFICANT CHANGE UP
VARIANT LYMPHS # BLD: 1 % — SIGNIFICANT CHANGE UP (ref 0–6)
WBC # BLD: 8.9 K/UL — SIGNIFICANT CHANGE UP (ref 3.8–10.5)
WBC # FLD AUTO: 8.9 K/UL — SIGNIFICANT CHANGE UP (ref 3.8–10.5)

## 2018-07-26 PROCEDURE — 80053 COMPREHEN METABOLIC PANEL: CPT

## 2018-07-26 PROCEDURE — 87086 URINE CULTURE/COLONY COUNT: CPT

## 2018-07-26 PROCEDURE — 82803 BLOOD GASES ANY COMBINATION: CPT

## 2018-07-26 PROCEDURE — 82947 ASSAY GLUCOSE BLOOD QUANT: CPT

## 2018-07-26 PROCEDURE — 85027 COMPLETE CBC AUTOMATED: CPT

## 2018-07-26 PROCEDURE — 74177 CT ABD & PELVIS W/CONTRAST: CPT

## 2018-07-26 PROCEDURE — 99284 EMERGENCY DEPT VISIT MOD MDM: CPT | Mod: 25

## 2018-07-26 PROCEDURE — 96374 THER/PROPH/DIAG INJ IV PUSH: CPT | Mod: XU

## 2018-07-26 PROCEDURE — 82435 ASSAY OF BLOOD CHLORIDE: CPT

## 2018-07-26 PROCEDURE — 96375 TX/PRO/DX INJ NEW DRUG ADDON: CPT

## 2018-07-26 PROCEDURE — 81003 URINALYSIS AUTO W/O SCOPE: CPT

## 2018-07-26 PROCEDURE — 84295 ASSAY OF SERUM SODIUM: CPT

## 2018-07-26 PROCEDURE — 82330 ASSAY OF CALCIUM: CPT

## 2018-07-26 PROCEDURE — 85730 THROMBOPLASTIN TIME PARTIAL: CPT

## 2018-07-26 PROCEDURE — 85014 HEMATOCRIT: CPT

## 2018-07-26 PROCEDURE — 83605 ASSAY OF LACTIC ACID: CPT

## 2018-07-26 PROCEDURE — 84132 ASSAY OF SERUM POTASSIUM: CPT

## 2018-07-26 PROCEDURE — 74177 CT ABD & PELVIS W/CONTRAST: CPT | Mod: 26

## 2018-07-26 PROCEDURE — 85610 PROTHROMBIN TIME: CPT

## 2018-07-26 PROCEDURE — 93005 ELECTROCARDIOGRAM TRACING: CPT

## 2018-07-26 RX ORDER — METRONIDAZOLE 500 MG
1 TABLET ORAL
Qty: 30 | Refills: 0
Start: 2018-07-26 | End: 2018-08-04

## 2018-07-26 RX ORDER — ONDANSETRON 8 MG/1
4 TABLET, FILM COATED ORAL ONCE
Qty: 0 | Refills: 0 | Status: COMPLETED | OUTPATIENT
Start: 2018-07-26 | End: 2018-07-26

## 2018-07-26 RX ORDER — METRONIDAZOLE 500 MG
500 TABLET ORAL ONCE
Qty: 0 | Refills: 0 | Status: COMPLETED | OUTPATIENT
Start: 2018-07-26 | End: 2018-07-26

## 2018-07-26 RX ORDER — FAMOTIDINE 10 MG/ML
20 INJECTION INTRAVENOUS ONCE
Qty: 0 | Refills: 0 | Status: COMPLETED | OUTPATIENT
Start: 2018-07-26 | End: 2018-07-26

## 2018-07-26 RX ORDER — MOXIFLOXACIN HYDROCHLORIDE TABLETS, 400 MG 400 MG/1
1 TABLET, FILM COATED ORAL
Qty: 20 | Refills: 0
Start: 2018-07-26 | End: 2018-08-04

## 2018-07-26 RX ORDER — SODIUM CHLORIDE 9 MG/ML
1000 INJECTION, SOLUTION INTRAVENOUS ONCE
Qty: 0 | Refills: 0 | Status: COMPLETED | OUTPATIENT
Start: 2018-07-26 | End: 2018-07-26

## 2018-07-26 RX ORDER — ACETAMINOPHEN 500 MG
1000 TABLET ORAL ONCE
Qty: 0 | Refills: 0 | Status: COMPLETED | OUTPATIENT
Start: 2018-07-26 | End: 2018-07-26

## 2018-07-26 RX ORDER — CIPROFLOXACIN LACTATE 400MG/40ML
500 VIAL (ML) INTRAVENOUS ONCE
Qty: 0 | Refills: 0 | Status: COMPLETED | OUTPATIENT
Start: 2018-07-26 | End: 2018-07-26

## 2018-07-26 RX ADMIN — ONDANSETRON 4 MILLIGRAM(S): 8 TABLET, FILM COATED ORAL at 01:11

## 2018-07-26 RX ADMIN — Medication 400 MILLIGRAM(S): at 03:37

## 2018-07-26 RX ADMIN — Medication 500 MILLIGRAM(S): at 04:15

## 2018-07-26 RX ADMIN — Medication 30 MILLILITER(S): at 01:11

## 2018-07-26 RX ADMIN — FAMOTIDINE 20 MILLIGRAM(S): 10 INJECTION INTRAVENOUS at 01:11

## 2018-07-26 RX ADMIN — SODIUM CHLORIDE 1000 MILLILITER(S): 9 INJECTION, SOLUTION INTRAVENOUS at 01:11

## 2018-07-26 RX ADMIN — SODIUM CHLORIDE 1000 MILLILITER(S): 9 INJECTION, SOLUTION INTRAVENOUS at 03:38

## 2018-07-26 NOTE — ED PROVIDER NOTE - PHYSICAL EXAMINATION
GEN APPEARANCE: WDWN, alert and cooperative, non-toxic appearing and in NAD; appears uncomfortable   HEAD: Atraumatic, normocephalic   EYES: PERRLa, EOMI, vision grossly intact.   EARS: Gross hearing intact.   NOSE: No nasal discharge, no external evidence of epistaxis.   THROAT: MMM. Oral cavity and pharynx normal. No inflammation, no swelling, no exudate, no oral lesions.  NECK: Supple  CV: RRR, S1S2, no c/r/m/g. No cyanosis or pallor. Extremities warm, well perfused. Cap refill <2 seconds. No bruits.   LUNGS: CTAB. No wheezing. No rales. No rhonchi. No diminished breath sounds.   ABDOMEN: supra pubic ttp, non surgical abdomen. No guarding or rebound. No masses.   MSK: Spine appears normal, no spine point tenderness. No CVA ttp. No joint erythema or tenderness. Normal muscular development. Pelvis stable.  EXTREMITIES: No peripheral edema. No obvious joint or bony deformity.  NEURO: Alert, follows commands. Weight bearing normal. Speech normal. Sensation and motor normal x4 extremities.   SKIN: Normal color for race, warm, dry and intact. No evidence of rash.  PSYCH: Normal mood and affect.

## 2018-07-26 NOTE — ED ADULT NURSE REASSESSMENT NOTE - NS ED NURSE REASSESS COMMENT FT1
Pt a&o x3, resting on stretcher, vital signs stable, medicated as per order. Repeat VBG sent to lab. Awaiting results/dispo.

## 2018-07-26 NOTE — ED ADULT NURSE NOTE - OBJECTIVE STATEMENT
36 y/o female, a&o x3, c/o suprapubic abd pn x1 day, radiating to left flank. Denies weakness, dizziness, fevers, chills, or chest pain. Breathing even, full, unlabored, no cough, no SOB. Abdomen soft, nondistended, tender of suprapubic region, bowel sounds x4 quadrants, positive nausea, no vomiting, one episode of diarrhea, no constipation, last BM today, no dysuria, no hematuria, no urinary frequency. Skin warm/dry/intact. Stretcher locked in low position. Advised of plan of care. Family at bedside.

## 2018-07-26 NOTE — ED ADULT NURSE REASSESSMENT NOTE - NS ED NURSE REASSESS COMMENT FT1
Pt reports feeling well enough to return home, with no questions. Pt a&o x3, resting on stretcher, in no apparent distress. Denies headache, weakness, dizziness, SOB, or chest pain. Okay to d/c as per MD.

## 2018-07-26 NOTE — ED PROVIDER NOTE - ATTENDING CONTRIBUTION TO CARE
36y/o F with h/o uterine fibroids s/p hysterectomy, presenting with suprapubic abdominal pain, radiating to left flank, 1 episode of bright red blood mixed with brown stools yesterday, and intermittent constipation/diarrhea.  No fever/chills.  No vomiting.      On exam, patient is well appearing in NAD, VSS, afebrile.  MMM.  Eyes anicteric, no conjunctival pallor.  Lungs CTABL.  Cardiac nrl s1s2 rrr no mgr.  Abdomen has mild suprapubic and left lower quadrant tenderness on my exam.  No visible rash.  No peripheral edema.  No palpable hernias (femoral or inguinal).  Pelvic exam deferred given s/p hysterectomy.      Given symptoms of abdominal pain (lower), diarrhea/constipation and other symptoms, concern for diverticulitis/colitis or other bowel pathology raised.  Labs, CT AP and (given suprapubic pain radiating to left flank) UA sent: DDx also includes UTI/cystitis/pyelonephritis, nephro/uretero-lithiasis.  Very unlikely traumatic injury or retroperitoneal bleeding given lack of trauma in history.  Very unlikely AAA or aortic rupture given no history of aortic pathology presenting pain/symptoms not c/w this.    Labs obtained, no significant anemia, leukocytosis or platelet abnormality on CBC.  CMP non-actionable.  CT AP obtained, and showing questionable sigmoid colitis.  initial vbg notable for lactate 2.2, showed HCT 32; repeat vbg after 2 L iv crystalloid infusion showed normalized lactate and no significant decrease in HCT.  Patient symptomatically improved, tolerating po.  No evidence on UA to suggest pyelo/infection or blood to suggest stone, and no renal pathology on CT; no aortic pathology noted on ct; likely mild colitis, will dc with antibiotics (cipro/flagyl).  Results of labs and CT d/w patient and instructed patient on importance of f/u with her PCP and with a gastroenterologist for further evaluation (likely to include colonoscopy as outpatient after completion of antibiotics).

## 2018-07-26 NOTE — ED PROVIDER NOTE - OBJECTIVE STATEMENT
37F hx of hysterectomy 2/2 fibroids, colitis 2/2 h.pylori presents with a cc of suprapubic abdominal pain x1 day, a/w intermittent radiation to L flank, also notes x1 episode of BRBPR 2/2 diarrhea after bearing down from being constipated, also c/o nausea, no vomiting, 37F hx of hysterectomy 2/2 fibroids, colitis 2/2 h.pylori presents with a cc of suprapubic abdominal pain x1 day, a/w intermittent radiation to L flank, also notes x1 episode of BRBPR 2/2 diarrhea after bearing down from being constipated, also c/o nausea, no vomiting, still passing gas - has had similar episode in past, feels like when had colitis, took x2 Excedrin at home w/o relief. No vaginal discharge. Denies /f/c/cp/sob. Denies headache, syncope, lightheadedness, dizziness. Denies chest palpitations, abdominal pain. Denies dysuria, hematuria, tarry stools, constipation.

## 2018-07-26 NOTE — ED PROVIDER NOTE - PLAN OF CARE
Thank you for visiting our Emergency Department, it has been a pleasure taking part in your healthcare.    Your discharge diagnosis is: colitis  Please take all discharge medications as indicated below:  Cipro 500mg twice a day x10 days  Flagyl 500mg three times a day x10 days  Please follow up with your PMD within x48 hours.  Please follow up with your Gastroenterologist (GI) within x48 hours.  A copy of resulted labs, imaging, and findings have been provided to you.   You have had a detailed discussion with your provider regarding your diagnosis, care management and discharge planning including, but not limited to: return precautions, follow up visits with existing or new providers, new prescriptions and/or medication changes, wound and/or spint/cast care or other care   aspects specific to your diagnosis and treatment. You have been given the opportunity to have your questions answered. At this time you have been deemed stable and fit for discharge.  Return precautions to the Emergency Department include but are not limited to: unrelenting nausea, vomiting, fever, chills, chest pain, shortness of breath, dizziness, chest or abdominal pain, worsening back pain, syncope, blood in urine or stool, headache that doesn't resolve, numbness or tingling, loss of sensation, loss of motor function, or any other concerning symptoms.

## 2018-07-26 NOTE — ED PROVIDER NOTE - PROGRESS NOTE DETAILS
Pt assessed at beside. Pt resting comfortably, pain controlled, pt questions answered. Vital signs stable. Discussed findings of CT scan suggestive of colitis - pt tolerating  PO meds in ED; asking to be d/c'd will d/c w/ pmd/gi f/u strict return precautions

## 2018-07-26 NOTE — ED PROVIDER NOTE - PMH
H pylori ulcer    Lumbar disc herniation  physical therapy  Patient is Hoahaoism  not completely baptized, will accept blood products at this time, patient doesn't have health care proxy  Submucous leiomyoma of uterus

## 2018-07-26 NOTE — ED PROVIDER NOTE - CARE PLAN
Principal Discharge DX:	Colitis  Assessment and plan of treatment:	Thank you for visiting our Emergency Department, it has been a pleasure taking part in your healthcare.    Your discharge diagnosis is: colitis  Please take all discharge medications as indicated below:  Cipro 500mg twice a day x10 days  Flagyl 500mg three times a day x10 days  Please follow up with your PMD within x48 hours.  Please follow up with your Gastroenterologist (GI) within x48 hours.  A copy of resulted labs, imaging, and findings have been provided to you.   You have had a detailed discussion with your provider regarding your diagnosis, care management and discharge planning including, but not limited to: return precautions, follow up visits with existing or new providers, new prescriptions and/or medication changes, wound and/or spint/cast care or other care   aspects specific to your diagnosis and treatment. You have been given the opportunity to have your questions answered. At this time you have been deemed stable and fit for discharge.  Return precautions to the Emergency Department include but are not limited to: unrelenting nausea, vomiting, fever, chills, chest pain, shortness of breath, dizziness, chest or abdominal pain, worsening back pain, syncope, blood in urine or stool, headache that doesn't resolve, numbness or tingling, loss of sensation, loss of motor function, or any other concerning symptoms.

## 2018-07-26 NOTE — ED ADULT NURSE NOTE - PMH
H pylori ulcer    Lumbar disc herniation  physical therapy  Patient is Jew  not completely baptized, will accept blood products at this time, patient doesn't have health care proxy  Submucous leiomyoma of uterus

## 2018-07-26 NOTE — ED PROVIDER NOTE - MEDICAL DECISION MAKING DETAILS
Kristal PGY2: 37F hx of hysterectomy 2/2 fibroids, colitis 2/2 h.pylori presents with a cc of suprapubic abdominal pain x1 day, a/w intermittent radiation to L flank, also notes x1 episode of BRBPR 2/2 diarrhea after bearing down from being constipated, also c/o nausea, no vomiting, still passing gas exam vss non toxic appears uncomfortable abdmn w/ suprapubic ttp ddx uti vs renal stone vs colitis will check labs ua ctap ivf zofran gi cocktail reassess

## 2018-07-27 LAB
CULTURE RESULTS: SIGNIFICANT CHANGE UP
SPECIMEN SOURCE: SIGNIFICANT CHANGE UP

## 2018-07-28 NOTE — ED POST DISCHARGE NOTE - DETAILS
7/28/18: Attempted to contact patient regarding ucx contamination results. No answer. Left voice message with callback number to PA admin line. - Dawit Gill PA-C 7/28/18 at 11:41AM: Attempted to contact patient regarding ucx contamination results. No answer. Left voice message with callback number to PA admin line. - Dawit Gill PA-C 7/29/18: M for patient to call back - Joyce Garrett PA-C

## 2018-07-28 NOTE — ED POST DISCHARGE NOTE - OTHER COMMUNICATION
Patient returned call to ED. Informed of results. Patient asymptomatic informed of results and encouraged to have repeat testing if onset of symptoms - Joyce Garrett PA-C

## 2019-04-24 ENCOUNTER — APPOINTMENT (OUTPATIENT)
Dept: OBGYN | Facility: CLINIC | Age: 39
End: 2019-04-24
Payer: COMMERCIAL

## 2019-04-24 ENCOUNTER — RESULT REVIEW (OUTPATIENT)
Age: 39
End: 2019-04-24

## 2019-04-24 PROCEDURE — 99396 PREV VISIT EST AGE 40-64: CPT

## 2020-02-19 ENCOUNTER — APPOINTMENT (OUTPATIENT)
Dept: GASTROENTEROLOGY | Facility: CLINIC | Age: 40
End: 2020-02-19
Payer: COMMERCIAL

## 2020-02-19 VITALS
WEIGHT: 184 LBS | HEIGHT: 63 IN | SYSTOLIC BLOOD PRESSURE: 106 MMHG | RESPIRATION RATE: 15 BRPM | BODY MASS INDEX: 32.6 KG/M2 | OXYGEN SATURATION: 98 % | HEART RATE: 90 BPM | DIASTOLIC BLOOD PRESSURE: 68 MMHG

## 2020-02-19 PROCEDURE — 99213 OFFICE O/P EST LOW 20 MIN: CPT

## 2020-02-19 NOTE — PHYSICAL EXAM
[General Appearance - Alert] : alert [General Appearance - In No Acute Distress] : in no acute distress [Sclera] : the sclera and conjunctiva were normal [Outer Ear] : the ears and nose were normal in appearance [Auscultation Breath Sounds / Voice Sounds] : lungs were clear to auscultation bilaterally [Heart Rate And Rhythm] : heart rate was normal and rhythm regular [Heart Sounds] : normal S1 and S2 [Heart Sounds Gallop] : no gallops [Heart Sounds Pericardial Friction Rub] : no pericardial rub [Murmurs] : no murmurs [Bowel Sounds] : normal bowel sounds [Edema] : there was no peripheral edema [Abdomen Soft] : soft [Abdomen Mass (___ Cm)] : no abdominal mass palpated [Abdomen Tenderness] : non-tender [] : no hepato-splenomegaly [Skin Color & Pigmentation] : normal skin color and pigmentation [No Focal Deficits] : no focal deficits

## 2020-02-19 NOTE — HISTORY OF PRESENT ILLNESS
[FreeTextEntry1] : Gaby present for a followup visit. The past few weeks she has been experiencing epigastric abdominal pain or bleeding to the right side on her back. This is associated with nausea without vomiting. No fevers or chills. She relates that on Monday she had severe abdominal pain and the pain usually lasts approximately 6 hours. On Monday when she had the pain was associated with one diarrheal stool. She denies rectal bleeding or melena. She denies weight loss. No specific foods appear to aggravate her GI symptoms. She continues with chronic constipation in that she has pellet-sized stools with bowel movements every 2-3 days. She tried Linzess in the past without response. She has a history of H. pylori gastritis treated with amoxicillin, clarithromycin and PPI therapy. When I first over in 2017 she had abnormal liver enzymes thought to be secondary to her diet pills. Followup blood work was normal. MRI with MRCP was unremarkable.

## 2020-02-19 NOTE — ASSESSMENT
[FreeTextEntry1] : This is a 39-year-old female with epigastric abdominal pain radiating to the back. I recommend blood work today including liver enzymes, amylase and lipase to rule out a biliary cause of her pain. I recommend an abdominal sonogram. I recommend stool for H. pylori antigen to verify eradication. While waiting for results, I recommend trial of omeprazole 20 mg daily. If the blood work and sonogram are unremarkable and she continues to have abdominal pain, consideration be given to an upper endoscopy.

## 2020-02-20 LAB
ALBUMIN SERPL ELPH-MCNC: 4.2 G/DL
ALP BLD-CCNC: 75 U/L
ALT SERPL-CCNC: 15 U/L
AMYLASE/CREAT SERPL: 51 U/L
ANION GAP SERPL CALC-SCNC: 13 MMOL/L
AST SERPL-CCNC: 20 U/L
BASOPHILS # BLD AUTO: 0.02 K/UL
BASOPHILS NFR BLD AUTO: 0.3 %
BILIRUB SERPL-MCNC: <0.2 MG/DL
BUN SERPL-MCNC: 10 MG/DL
CALCIUM SERPL-MCNC: 9.7 MG/DL
CHLORIDE SERPL-SCNC: 108 MMOL/L
CO2 SERPL-SCNC: 22 MMOL/L
CREAT SERPL-MCNC: 0.71 MG/DL
EOSINOPHIL # BLD AUTO: 0.09 K/UL
EOSINOPHIL NFR BLD AUTO: 1.5 %
GLUCOSE SERPL-MCNC: 93 MG/DL
HCT VFR BLD CALC: 39.9 %
HGB BLD-MCNC: 12.5 G/DL
IMM GRANULOCYTES NFR BLD AUTO: 0.3 %
LPL SERPL-CCNC: 27 U/L
LYMPHOCYTES # BLD AUTO: 2.88 K/UL
LYMPHOCYTES NFR BLD AUTO: 46.9 %
MAN DIFF?: NORMAL
MCHC RBC-ENTMCNC: 28 PG
MCHC RBC-ENTMCNC: 31.3 GM/DL
MCV RBC AUTO: 89.3 FL
MONOCYTES # BLD AUTO: 0.48 K/UL
MONOCYTES NFR BLD AUTO: 7.8 %
NEUTROPHILS # BLD AUTO: 2.65 K/UL
NEUTROPHILS NFR BLD AUTO: 43.2 %
PLATELET # BLD AUTO: 252 K/UL
POTASSIUM SERPL-SCNC: 4.4 MMOL/L
PROT SERPL-MCNC: 7.5 G/DL
RBC # BLD: 4.47 M/UL
RBC # FLD: 14 %
SODIUM SERPL-SCNC: 143 MMOL/L
WBC # FLD AUTO: 6.14 K/UL

## 2020-02-28 ENCOUNTER — TRANSCRIPTION ENCOUNTER (OUTPATIENT)
Age: 40
End: 2020-02-28

## 2020-04-02 DIAGNOSIS — U07.1 COVID-19: ICD-10-CM

## 2020-04-04 ENCOUNTER — APPOINTMENT (OUTPATIENT)
Dept: DISASTER EMERGENCY | Facility: CLINIC | Age: 40
End: 2020-04-04

## 2020-05-07 ENCOUNTER — APPOINTMENT (OUTPATIENT)
Dept: GASTROENTEROLOGY | Facility: CLINIC | Age: 40
End: 2020-05-07
Payer: COMMERCIAL

## 2020-05-07 ENCOUNTER — APPOINTMENT (OUTPATIENT)
Dept: GASTROENTEROLOGY | Facility: CLINIC | Age: 40
End: 2020-05-07

## 2020-05-07 DIAGNOSIS — R19.7 DIARRHEA, UNSPECIFIED: ICD-10-CM

## 2020-05-07 PROCEDURE — 99213 OFFICE O/P EST LOW 20 MIN: CPT | Mod: 95

## 2020-05-07 NOTE — HISTORY OF PRESENT ILLNESS
[Home] : at home, [unfilled] , at the time of the visit. [Other Location: e.g. Home (Enter Location, City,State)___] : at [unfilled] [Patient] : the patient [FreeTextEntry2] : Gaby Ding [FreeTextEntry1] : Gaby Presents for a telehealth visit. For the past 1-1/2 weeks she has been having diarrhea only after eating consisting of liquid stool without bleeding. She has gas and bloating. No nausea or vomiting. No fevers or chills. No one else is sick at home. She denies any use of recent antibiotics. She denies any new medications. She still on omeprazole 20 mg daily for epigastric abdominal pain that she had seen before in February. I recommend stopping the omeprazole. She no longer has epigastric abdominal pain or heartburn symptoms. She relates that she tested positive for the covid virus Possibly one month ago. Her symptoms consisted of fevers and coughing. She no longer has these symptoms.

## 2020-05-07 NOTE — ASSESSMENT
[FreeTextEntry1] : This is a 39-year-old female with recent diagnosis of COVID 19 infection reporting 1-1/2 weeks of nonbloody diarrhea. I'm not sure if this is related to the COVID virus or viral gastroenteritis. I recommend stool GI PCR and C diff PCR. She is to hydrate well by increasing her water intake. She can take Imodium as needed. For now she is to avoid dairy products. She is to stop omeprazole as it could possibly cause side effects of diarrhea.She is to call me if she has questions or concerns.

## 2020-05-13 LAB
C DIFF TOX GENS STL QL NAA+PROBE: NORMAL
CDIFF BY PCR: NOT DETECTED
GI PCR PANEL, STOOL: NORMAL

## 2020-09-25 ENCOUNTER — EMERGENCY (EMERGENCY)
Facility: HOSPITAL | Age: 40
LOS: 1 days | Discharge: HOME | End: 2020-09-25
Attending: EMERGENCY MEDICINE
Payer: COMMERCIAL

## 2020-09-25 VITALS
DIASTOLIC BLOOD PRESSURE: 84 MMHG | RESPIRATION RATE: 16 BRPM | TEMPERATURE: 98 F | OXYGEN SATURATION: 100 % | HEART RATE: 70 BPM | SYSTOLIC BLOOD PRESSURE: 112 MMHG

## 2020-09-25 VITALS
HEIGHT: 64 IN | SYSTOLIC BLOOD PRESSURE: 113 MMHG | OXYGEN SATURATION: 99 % | DIASTOLIC BLOOD PRESSURE: 77 MMHG | RESPIRATION RATE: 18 BRPM | HEART RATE: 79 BPM | TEMPERATURE: 99 F

## 2020-09-25 DIAGNOSIS — Z98.890 OTHER SPECIFIED POSTPROCEDURAL STATES: Chronic | ICD-10-CM

## 2020-09-25 DIAGNOSIS — Z90.13 ACQUIRED ABSENCE OF BILATERAL BREASTS AND NIPPLES: Chronic | ICD-10-CM

## 2020-09-25 DIAGNOSIS — Z30.2 ENCOUNTER FOR STERILIZATION: Chronic | ICD-10-CM

## 2020-09-25 DIAGNOSIS — D17.1 BENIGN LIPOMATOUS NEOPLASM OF SKIN AND SUBCUTANEOUS TISSUE OF TRUNK: Chronic | ICD-10-CM

## 2020-09-25 LAB
ALBUMIN SERPL ELPH-MCNC: 4.1 G/DL — SIGNIFICANT CHANGE UP (ref 3.3–5)
ALP SERPL-CCNC: 74 U/L — SIGNIFICANT CHANGE UP (ref 40–120)
ALT FLD-CCNC: 8 U/L — LOW (ref 10–45)
ANION GAP SERPL CALC-SCNC: 10 MMOL/L — SIGNIFICANT CHANGE UP (ref 5–17)
APPEARANCE UR: CLEAR — SIGNIFICANT CHANGE UP
APTT BLD: 33.5 SEC — SIGNIFICANT CHANGE UP (ref 27.5–35.5)
AST SERPL-CCNC: 16 U/L — SIGNIFICANT CHANGE UP (ref 10–40)
BACTERIA # UR AUTO: NEGATIVE — SIGNIFICANT CHANGE UP
BASOPHILS # BLD AUTO: 0.01 K/UL — SIGNIFICANT CHANGE UP (ref 0–0.2)
BASOPHILS NFR BLD AUTO: 0.2 % — SIGNIFICANT CHANGE UP (ref 0–2)
BILIRUB SERPL-MCNC: 0.1 MG/DL — LOW (ref 0.2–1.2)
BILIRUB UR-MCNC: NEGATIVE — SIGNIFICANT CHANGE UP
BUN SERPL-MCNC: 10 MG/DL — SIGNIFICANT CHANGE UP (ref 7–23)
CALCIUM SERPL-MCNC: 9.7 MG/DL — SIGNIFICANT CHANGE UP (ref 8.4–10.5)
CHLORIDE SERPL-SCNC: 105 MMOL/L — SIGNIFICANT CHANGE UP (ref 96–108)
CO2 SERPL-SCNC: 23 MMOL/L — SIGNIFICANT CHANGE UP (ref 22–31)
COLOR SPEC: COLORLESS — SIGNIFICANT CHANGE UP
CREAT SERPL-MCNC: 0.67 MG/DL — SIGNIFICANT CHANGE UP (ref 0.5–1.3)
DIFF PNL FLD: NEGATIVE — SIGNIFICANT CHANGE UP
EOSINOPHIL # BLD AUTO: 0.04 K/UL — SIGNIFICANT CHANGE UP (ref 0–0.5)
EOSINOPHIL NFR BLD AUTO: 0.6 % — SIGNIFICANT CHANGE UP (ref 0–6)
EPI CELLS # UR: 1 /HPF — SIGNIFICANT CHANGE UP
GLUCOSE SERPL-MCNC: 97 MG/DL — SIGNIFICANT CHANGE UP (ref 70–99)
GLUCOSE UR QL: NEGATIVE — SIGNIFICANT CHANGE UP
HCT VFR BLD CALC: 37.2 % — SIGNIFICANT CHANGE UP (ref 34.5–45)
HGB BLD-MCNC: 12.3 G/DL — SIGNIFICANT CHANGE UP (ref 11.5–15.5)
IMM GRANULOCYTES NFR BLD AUTO: 0.2 % — SIGNIFICANT CHANGE UP (ref 0–1.5)
INR BLD: 0.97 RATIO — SIGNIFICANT CHANGE UP (ref 0.88–1.16)
KETONES UR-MCNC: NEGATIVE — SIGNIFICANT CHANGE UP
LEUKOCYTE ESTERASE UR-ACNC: NEGATIVE — SIGNIFICANT CHANGE UP
LYMPHOCYTES # BLD AUTO: 3.15 K/UL — SIGNIFICANT CHANGE UP (ref 1–3.3)
LYMPHOCYTES # BLD AUTO: 50.2 % — HIGH (ref 13–44)
MCHC RBC-ENTMCNC: 28.3 PG — SIGNIFICANT CHANGE UP (ref 27–34)
MCHC RBC-ENTMCNC: 33.1 GM/DL — SIGNIFICANT CHANGE UP (ref 32–36)
MCV RBC AUTO: 85.7 FL — SIGNIFICANT CHANGE UP (ref 80–100)
MONOCYTES # BLD AUTO: 0.32 K/UL — SIGNIFICANT CHANGE UP (ref 0–0.9)
MONOCYTES NFR BLD AUTO: 5.1 % — SIGNIFICANT CHANGE UP (ref 2–14)
NEUTROPHILS # BLD AUTO: 2.74 K/UL — SIGNIFICANT CHANGE UP (ref 1.8–7.4)
NEUTROPHILS NFR BLD AUTO: 43.7 % — SIGNIFICANT CHANGE UP (ref 43–77)
NITRITE UR-MCNC: NEGATIVE — SIGNIFICANT CHANGE UP
NRBC # BLD: 0 /100 WBCS — SIGNIFICANT CHANGE UP (ref 0–0)
PH UR: 6 — SIGNIFICANT CHANGE UP (ref 5–8)
PLATELET # BLD AUTO: 241 K/UL — SIGNIFICANT CHANGE UP (ref 150–400)
POTASSIUM SERPL-MCNC: 3.4 MMOL/L — LOW (ref 3.5–5.3)
POTASSIUM SERPL-SCNC: 3.4 MMOL/L — LOW (ref 3.5–5.3)
PROT SERPL-MCNC: 7.3 G/DL — SIGNIFICANT CHANGE UP (ref 6–8.3)
PROT UR-MCNC: NEGATIVE — SIGNIFICANT CHANGE UP
PROTHROM AB SERPL-ACNC: 11.6 SEC — SIGNIFICANT CHANGE UP (ref 10.6–13.6)
RBC # BLD: 4.34 M/UL — SIGNIFICANT CHANGE UP (ref 3.8–5.2)
RBC # FLD: 12.8 % — SIGNIFICANT CHANGE UP (ref 10.3–14.5)
RBC CASTS # UR COMP ASSIST: 0 /HPF — SIGNIFICANT CHANGE UP (ref 0–4)
SODIUM SERPL-SCNC: 138 MMOL/L — SIGNIFICANT CHANGE UP (ref 135–145)
SP GR SPEC: 1.01 — SIGNIFICANT CHANGE UP (ref 1.01–1.02)
UROBILINOGEN FLD QL: NEGATIVE — SIGNIFICANT CHANGE UP
WBC # BLD: 6.27 K/UL — SIGNIFICANT CHANGE UP (ref 3.8–10.5)
WBC # FLD AUTO: 6.27 K/UL — SIGNIFICANT CHANGE UP (ref 3.8–10.5)
WBC UR QL: 0 /HPF — SIGNIFICANT CHANGE UP (ref 0–5)

## 2020-09-25 PROCEDURE — 85730 THROMBOPLASTIN TIME PARTIAL: CPT

## 2020-09-25 PROCEDURE — 96375 TX/PRO/DX INJ NEW DRUG ADDON: CPT

## 2020-09-25 PROCEDURE — 85025 COMPLETE CBC W/AUTO DIFF WBC: CPT

## 2020-09-25 PROCEDURE — 87086 URINE CULTURE/COLONY COUNT: CPT

## 2020-09-25 PROCEDURE — 99284 EMERGENCY DEPT VISIT MOD MDM: CPT | Mod: 25

## 2020-09-25 PROCEDURE — 99284 EMERGENCY DEPT VISIT MOD MDM: CPT

## 2020-09-25 PROCEDURE — 80053 COMPREHEN METABOLIC PANEL: CPT

## 2020-09-25 PROCEDURE — 83690 ASSAY OF LIPASE: CPT

## 2020-09-25 PROCEDURE — 81001 URINALYSIS AUTO W/SCOPE: CPT

## 2020-09-25 PROCEDURE — 85610 PROTHROMBIN TIME: CPT

## 2020-09-25 PROCEDURE — 96374 THER/PROPH/DIAG INJ IV PUSH: CPT

## 2020-09-25 RX ORDER — SODIUM CHLORIDE 9 MG/ML
1000 INJECTION INTRAMUSCULAR; INTRAVENOUS; SUBCUTANEOUS ONCE
Refills: 0 | Status: COMPLETED | OUTPATIENT
Start: 2020-09-25 | End: 2020-09-25

## 2020-09-25 RX ORDER — PANTOPRAZOLE SODIUM 20 MG/1
1 TABLET, DELAYED RELEASE ORAL
Qty: 14 | Refills: 0
Start: 2020-09-25 | End: 2020-10-01

## 2020-09-25 RX ORDER — MORPHINE SULFATE 50 MG/1
4 CAPSULE, EXTENDED RELEASE ORAL ONCE
Refills: 0 | Status: DISCONTINUED | OUTPATIENT
Start: 2020-09-25 | End: 2020-09-25

## 2020-09-25 RX ORDER — FAMOTIDINE 10 MG/ML
20 INJECTION INTRAVENOUS ONCE
Refills: 0 | Status: COMPLETED | OUTPATIENT
Start: 2020-09-25 | End: 2020-09-25

## 2020-09-25 RX ORDER — ONDANSETRON 8 MG/1
4 TABLET, FILM COATED ORAL ONCE
Refills: 0 | Status: COMPLETED | OUTPATIENT
Start: 2020-09-25 | End: 2020-09-25

## 2020-09-25 RX ADMIN — MORPHINE SULFATE 4 MILLIGRAM(S): 50 CAPSULE, EXTENDED RELEASE ORAL at 20:39

## 2020-09-25 RX ADMIN — SODIUM CHLORIDE 1000 MILLILITER(S): 9 INJECTION INTRAMUSCULAR; INTRAVENOUS; SUBCUTANEOUS at 19:08

## 2020-09-25 RX ADMIN — ONDANSETRON 4 MILLIGRAM(S): 8 TABLET, FILM COATED ORAL at 19:08

## 2020-09-25 RX ADMIN — FAMOTIDINE 20 MILLIGRAM(S): 10 INJECTION INTRAVENOUS at 19:08

## 2020-09-25 RX ADMIN — Medication 30 MILLILITER(S): at 19:08

## 2020-09-25 RX ADMIN — MORPHINE SULFATE 4 MILLIGRAM(S): 50 CAPSULE, EXTENDED RELEASE ORAL at 19:08

## 2020-09-25 NOTE — ED PROVIDER NOTE - PHYSICAL EXAMINATION
PGY3/MD Shauna.  VITALS: reviewed  GEN: No apparent distress, A & O x 4  HEAD/EYES: NC/AT, PERRL, EOMI, anicteric sclerae, no conjunctival pallor  ENT: mucus membranes moist, oropharynx WNL, trachea midline, no JVD, neck is supple  RESP: lungs CTA with equal breath sounds bilaterally, chest wall nontender and atraumatic  CV: heart with reg rhythm S1, S2, no murmur; distal pulses intact and symmetric bilaterally  ABDOMEN: normoactive bowel sounds, soft, nondistended, nontender + old vertical surgical scar under umbilicas  MSK: extremities atraumatic and nontender, no edema, no asymmetry.   SKIN: warm, dry, no rash, no bruising, no cyanosis. color appropriate for ethnicity  NEURO: alert, mentating appropriately, no facial asymmetry.  PSYCH: Affect appropriate

## 2020-09-25 NOTE — ED PROVIDER NOTE - NSFOLLOWUPINSTRUCTIONS_ED_ALL_ED_FT
Thank you for visiting our Emergency Department, it has been a pleasure taking part in your healthcare.    You had a thorough evaluation including an exam, labs and imaging. You were given medications for comfort. Your workup did not demonstrate any concerning findings. This does not mean that your symptoms are not real, only that we were unable to find a dangerous or life-threatening cause. Please read the attached information sheets as they will provide useful information regarding your condition.    Your discharge diagnosis is: epigastric pain  Please take all discharge medications as indicated below:   Return precautions to the Emergency Department include but are not limited to: worsening pain despite medications, persistent nausea/vomiting (can't keep water down at all) fevers, unrelenting nausea, vomiting, shortness of breath, dizziness, chest or abdominal pain, worsening back pain, syncope, blood in urine, stool, or vomit, headache that doesn't resolve, numbness or tingling, loss of sensation, loss of motor function, or any other concerning symptoms.    1) Follow up with your primary care doctor within 48 hours. Please call [phone] to make an appointment or with any questions you may have.  or call [phone] to make an appointment with the clinic  2) You should also establish care with Gastroenterology by calling  [phone] to find a doctor affiliated with [facility] in your neighborhood & network.   3) Take over the counter Pepcid 20 mg every 12 hrs. Take maalox 2 tablespoons (30ml) as needed for epigastric discomfort. Take simethicone (GasX) every 6 hours as needed for gas pain, cramping, burping or flatulance.  4) Drink at least 2 Liters or 64 Ounces of water each day.  5) Read all attached.

## 2020-09-25 NOTE — ED PROVIDER NOTE - PROGRESS NOTE DETAILS
PGY3/MD Shauna. resolved pain, PO challenge successful. likely GI origin, out pt GI follow up. I have given the pt strict return and follow up precautions. The patient has been provided with a copy of all pertinent results. The patient has been informed of all concerning signs and symptoms to return to Emergency Department, the necessity to follow up with PMD/Clinic/follow up provided within 2-3 days was explained, and the patient reports understanding of above with capacity and insight.

## 2020-09-25 NOTE — ED PROVIDER NOTE - OBJECTIVE STATEMENT
PGY3/MD Shauna. 39 yo F with h/o PU, PSH of , p/w epigastric pain, x1d. On-off, but progressively worsening, 8/10, radiation to the back, denies association with food, denies black stool. Once vomit but no blood. Denies h/o HA or family h/o CAD. no fever, cough. Pt had COVID last April, no admission. Has GI doctorVanita not seen this year yet.

## 2020-09-25 NOTE — ED PROVIDER NOTE - CARE PROVIDER_API CALL
Daniela Trinidad  GASTROENTEROLOGY  [address] [place] [address] [address]  [place] [address]  Phone: [phone]  Fax: [phone]  Follow Up Time: 1-3 Days

## 2020-09-25 NOTE — ED PROVIDER NOTE - PMH
H pylori ulcer    Lumbar disc herniation  physical therapy  Patient is Orthodoxy  not completely baptized, will accept blood products at this time, patient doesn't have health care proxy  Submucous leiomyoma of uterus

## 2020-09-25 NOTE — ED PROVIDER NOTE - PATIENT PORTAL LINK FT
You can access the FollowMyHealth Patient Portal offered by [facility] by registering at the following website: [email or web address]. By joining [employer]’s FollowMyHealth portal, you will also be able to view your health information using other applications (apps) compatible with our system.

## 2020-09-25 NOTE — ED PROVIDER NOTE - CLINICAL SUMMARY MEDICAL DECISION MAKING FREE TEXT BOX
PGY3/MD Shauna. 39 yo F with h/o PU, , p/w epigastric pain, on-off, significantly worsen today but self limited, on my PE, not surgical abdomen, soft and non distension, will give her pain meds, fluid, reassess, if the pain is resolved, likely d/c and GI follow up. PGY3/MD Shauna. 39 yo F with h/o PU, , p/w epigastric pain, on-off, significantly worsen today but self limited, on my PE, not surgical abdomen, soft and non distension, will give her pain meds, fluid, reassess, if the pain is resolved, likely d/c and GI follow up.  Yamileth: 39 yo with hx of ulcer. presents with epigastric pain since noon. very uncomfortable. +nausea. on exam very little tenderness. no bleeding. will treat as gastritis. also check for pancreatitis.

## 2020-09-25 NOTE — ED ADULT NURSE NOTE - OBJECTIVE STATEMENT
39y/o female presented to the ED from home with complaint of abdominal pain. A&Ox3, ambulatory. PMH- GERD, hysterectomy. Patient reports acute onset of epigastric pain radiating to back since 12 PM this afternoon. Patient states that pain is constant burning and aching. Patient states that she was been unable to eat due to pain. Reports nausea no vomiting. Denies black/bloody stool. denies urinary symptoms. Denies chest pain, SOB, fever, chills, dizziness, syncope.    Patient reports feeling depressed within the last two weeks. Reports feeling depressed since losing her father to COVID in april. Patient states that she has support system at home, not currently meeting with psychologist. Denies SI/ HI at this time.

## 2020-09-25 NOTE — ED PROVIDER NOTE - ATTENDING CONTRIBUTION TO CARE
I performed a history and physical exam of the patient and discussed their management with the resident and /or advanced care provider. I reviewed the resident and /or ACP's note and agree with the documented findings and plan of care. My medical decision making and observations are found above.  Lungs clear, abd slight epigastric pain.

## 2020-09-26 LAB
CULTURE RESULTS: SIGNIFICANT CHANGE UP
SPECIMEN SOURCE: SIGNIFICANT CHANGE UP

## 2020-10-05 ENCOUNTER — APPOINTMENT (OUTPATIENT)
Dept: GASTROENTEROLOGY | Facility: CLINIC | Age: 40
End: 2020-10-05
Payer: COMMERCIAL

## 2020-10-05 VITALS
OXYGEN SATURATION: 98 % | BODY MASS INDEX: 32.78 KG/M2 | RESPIRATION RATE: 16 BRPM | SYSTOLIC BLOOD PRESSURE: 90 MMHG | HEART RATE: 106 BPM | HEIGHT: 63 IN | TEMPERATURE: 98.4 F | DIASTOLIC BLOOD PRESSURE: 56 MMHG | WEIGHT: 185 LBS

## 2020-10-05 DIAGNOSIS — R10.13 EPIGASTRIC PAIN: ICD-10-CM

## 2020-10-05 PROCEDURE — 99213 OFFICE O/P EST LOW 20 MIN: CPT

## 2020-10-05 NOTE — ASSESSMENT
[FreeTextEntry1] : This is a 40 yo female with epigastric abdominal pain. 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 I recommend eating small meals she most likely has gastritis. I recommend going back on omeprazole 20 mg half hour before breakfast and famotidine 40 mg at night. If no response after 2 weeks, she is to give me a call at which time I will get an official abdominal sonogram and schedule an upper endoscopy. Multiple questions were answered. She stated understanding.

## 2020-10-05 NOTE — HISTORY OF PRESENT ILLNESS
[FreeTextEntry1] : Gaby Presents for a followup visit. She relates that in September 25 she woke up with a bandlike abdominal pain starting in the epigastric region a bleeding to both sides of the abdomen to the back. This was not associated with nausea or vomiting. The day prior she had diarrhea. But she always has diarrhea. No fevers or chills. She went to the emergency room where blood work was performed showing a normal CBC, normal liver enzymes. She states that she had a sonogram performed unofficially which was unremarkable. There is no report on the chart. She is now feeling slightly better. No melena or rectal bleeding.

## 2020-10-29 DIAGNOSIS — K58.0 IRRITABLE BOWEL SYNDROME WITH DIARRHEA: ICD-10-CM

## 2020-10-29 DIAGNOSIS — R14.0 ABDOMINAL DISTENSION (GASEOUS): ICD-10-CM

## 2020-10-31 ENCOUNTER — EMERGENCY (EMERGENCY)
Facility: HOSPITAL | Age: 40
LOS: 1 days | Discharge: HOME | End: 2020-10-31
Attending: EMERGENCY MEDICINE
Payer: COMMERCIAL

## 2020-10-31 VITALS
HEIGHT: 64 IN | RESPIRATION RATE: 20 BRPM | SYSTOLIC BLOOD PRESSURE: 164 MMHG | DIASTOLIC BLOOD PRESSURE: 101 MMHG | TEMPERATURE: 98 F | OXYGEN SATURATION: 100 % | HEART RATE: 88 BPM | WEIGHT: 184.97 LBS

## 2020-10-31 VITALS
DIASTOLIC BLOOD PRESSURE: 80 MMHG | OXYGEN SATURATION: 100 % | HEART RATE: 75 BPM | TEMPERATURE: 98 F | SYSTOLIC BLOOD PRESSURE: 116 MMHG | RESPIRATION RATE: 14 BRPM

## 2020-10-31 DIAGNOSIS — Z30.2 ENCOUNTER FOR STERILIZATION: Chronic | ICD-10-CM

## 2020-10-31 DIAGNOSIS — Z98.890 OTHER SPECIFIED POSTPROCEDURAL STATES: Chronic | ICD-10-CM

## 2020-10-31 DIAGNOSIS — Z90.13 ACQUIRED ABSENCE OF BILATERAL BREASTS AND NIPPLES: Chronic | ICD-10-CM

## 2020-10-31 DIAGNOSIS — D17.1 BENIGN LIPOMATOUS NEOPLASM OF SKIN AND SUBCUTANEOUS TISSUE OF TRUNK: Chronic | ICD-10-CM

## 2020-10-31 LAB
ALBUMIN SERPL ELPH-MCNC: 3.9 G/DL — SIGNIFICANT CHANGE UP (ref 3.3–5)
ALP SERPL-CCNC: 82 U/L — SIGNIFICANT CHANGE UP (ref 40–120)
ALT FLD-CCNC: 12 U/L — SIGNIFICANT CHANGE UP (ref 10–45)
ANION GAP SERPL CALC-SCNC: 12 MMOL/L — SIGNIFICANT CHANGE UP (ref 5–17)
ANION GAP SERPL CALC-SCNC: 13 MMOL/L — SIGNIFICANT CHANGE UP (ref 5–17)
APPEARANCE UR: CLEAR — SIGNIFICANT CHANGE UP
AST SERPL-CCNC: 24 U/L — SIGNIFICANT CHANGE UP (ref 10–40)
BACTERIA # UR AUTO: NEGATIVE — SIGNIFICANT CHANGE UP
BASOPHILS # BLD AUTO: 0.04 K/UL — SIGNIFICANT CHANGE UP (ref 0–0.2)
BASOPHILS NFR BLD AUTO: 0.6 % — SIGNIFICANT CHANGE UP (ref 0–2)
BILIRUB SERPL-MCNC: 0.1 MG/DL — LOW (ref 0.2–1.2)
BILIRUB UR-MCNC: NEGATIVE — SIGNIFICANT CHANGE UP
BUN SERPL-MCNC: 6 MG/DL — LOW (ref 7–23)
BUN SERPL-MCNC: 6 MG/DL — LOW (ref 7–23)
CALCIUM SERPL-MCNC: 8.9 MG/DL — SIGNIFICANT CHANGE UP (ref 8.4–10.5)
CALCIUM SERPL-MCNC: 9 MG/DL — SIGNIFICANT CHANGE UP (ref 8.4–10.5)
CHLORIDE SERPL-SCNC: 105 MMOL/L — SIGNIFICANT CHANGE UP (ref 96–108)
CHLORIDE SERPL-SCNC: 106 MMOL/L — SIGNIFICANT CHANGE UP (ref 96–108)
CO2 SERPL-SCNC: 20 MMOL/L — LOW (ref 22–31)
CO2 SERPL-SCNC: 20 MMOL/L — LOW (ref 22–31)
COLOR SPEC: SIGNIFICANT CHANGE UP
CREAT SERPL-MCNC: 0.56 MG/DL — SIGNIFICANT CHANGE UP (ref 0.5–1.3)
CREAT SERPL-MCNC: 0.66 MG/DL — SIGNIFICANT CHANGE UP (ref 0.5–1.3)
DIFF PNL FLD: NEGATIVE — SIGNIFICANT CHANGE UP
EOSINOPHIL # BLD AUTO: 0.07 K/UL — SIGNIFICANT CHANGE UP (ref 0–0.5)
EOSINOPHIL NFR BLD AUTO: 1 % — SIGNIFICANT CHANGE UP (ref 0–6)
EPI CELLS # UR: 3 /HPF — SIGNIFICANT CHANGE UP
GLUCOSE SERPL-MCNC: 105 MG/DL — HIGH (ref 70–99)
GLUCOSE SERPL-MCNC: 111 MG/DL — HIGH (ref 70–99)
GLUCOSE UR QL: NEGATIVE — SIGNIFICANT CHANGE UP
HCG UR QL: NEGATIVE — SIGNIFICANT CHANGE UP
HCT VFR BLD CALC: 38.9 % — SIGNIFICANT CHANGE UP (ref 34.5–45)
HGB BLD-MCNC: 12.9 G/DL — SIGNIFICANT CHANGE UP (ref 11.5–15.5)
HYALINE CASTS # UR AUTO: 0 /LPF — SIGNIFICANT CHANGE UP (ref 0–2)
IMM GRANULOCYTES NFR BLD AUTO: 0.1 % — SIGNIFICANT CHANGE UP (ref 0–1.5)
KETONES UR-MCNC: NEGATIVE — SIGNIFICANT CHANGE UP
LEUKOCYTE ESTERASE UR-ACNC: NEGATIVE — SIGNIFICANT CHANGE UP
LIDOCAIN IGE QN: 28 U/L — SIGNIFICANT CHANGE UP (ref 7–60)
LYMPHOCYTES # BLD AUTO: 3.5 K/UL — HIGH (ref 1–3.3)
LYMPHOCYTES # BLD AUTO: 49.3 % — HIGH (ref 13–44)
MAGNESIUM SERPL-MCNC: 2.5 MG/DL — SIGNIFICANT CHANGE UP (ref 1.6–2.6)
MCHC RBC-ENTMCNC: 28.6 PG — SIGNIFICANT CHANGE UP (ref 27–34)
MCHC RBC-ENTMCNC: 33.2 GM/DL — SIGNIFICANT CHANGE UP (ref 32–36)
MCV RBC AUTO: 86.3 FL — SIGNIFICANT CHANGE UP (ref 80–100)
MONOCYTES # BLD AUTO: 0.36 K/UL — SIGNIFICANT CHANGE UP (ref 0–0.9)
MONOCYTES NFR BLD AUTO: 5.1 % — SIGNIFICANT CHANGE UP (ref 2–14)
NEUTROPHILS # BLD AUTO: 3.12 K/UL — SIGNIFICANT CHANGE UP (ref 1.8–7.4)
NEUTROPHILS NFR BLD AUTO: 43.9 % — SIGNIFICANT CHANGE UP (ref 43–77)
NITRITE UR-MCNC: NEGATIVE — SIGNIFICANT CHANGE UP
NRBC # BLD: 0 /100 WBCS — SIGNIFICANT CHANGE UP (ref 0–0)
PH UR: 7 — SIGNIFICANT CHANGE UP (ref 5–8)
PLATELET # BLD AUTO: 250 K/UL — SIGNIFICANT CHANGE UP (ref 150–400)
POTASSIUM SERPL-MCNC: 4.5 MMOL/L — SIGNIFICANT CHANGE UP (ref 3.5–5.3)
POTASSIUM SERPL-MCNC: 4.5 MMOL/L — SIGNIFICANT CHANGE UP (ref 3.5–5.3)
POTASSIUM SERPL-SCNC: 4.5 MMOL/L — SIGNIFICANT CHANGE UP (ref 3.5–5.3)
POTASSIUM SERPL-SCNC: 4.5 MMOL/L — SIGNIFICANT CHANGE UP (ref 3.5–5.3)
PROT SERPL-MCNC: 7.5 G/DL — SIGNIFICANT CHANGE UP (ref 6–8.3)
PROT UR-MCNC: SIGNIFICANT CHANGE UP
RBC # BLD: 4.51 M/UL — SIGNIFICANT CHANGE UP (ref 3.8–5.2)
RBC # FLD: 13.2 % — SIGNIFICANT CHANGE UP (ref 10.3–14.5)
RBC CASTS # UR COMP ASSIST: 7 /HPF — HIGH (ref 0–4)
SODIUM SERPL-SCNC: 137 MMOL/L — SIGNIFICANT CHANGE UP (ref 135–145)
SODIUM SERPL-SCNC: 139 MMOL/L — SIGNIFICANT CHANGE UP (ref 135–145)
SP GR SPEC: 1.02 — SIGNIFICANT CHANGE UP (ref 1.01–1.02)
TROPONIN T, HIGH SENSITIVITY RESULT: <6 NG/L — SIGNIFICANT CHANGE UP (ref 0–51)
UROBILINOGEN FLD QL: NEGATIVE — SIGNIFICANT CHANGE UP
WBC # BLD: 7.1 K/UL — SIGNIFICANT CHANGE UP (ref 3.8–10.5)
WBC # FLD AUTO: 7.1 K/UL — SIGNIFICANT CHANGE UP (ref 3.8–10.5)
WBC UR QL: 2 /HPF — SIGNIFICANT CHANGE UP (ref 0–5)

## 2020-10-31 PROCEDURE — 80048 BASIC METABOLIC PNL TOTAL CA: CPT

## 2020-10-31 PROCEDURE — 93005 ELECTROCARDIOGRAM TRACING: CPT

## 2020-10-31 PROCEDURE — 71046 X-RAY EXAM CHEST 2 VIEWS: CPT | Mod: 26

## 2020-10-31 PROCEDURE — 96374 THER/PROPH/DIAG INJ IV PUSH: CPT

## 2020-10-31 PROCEDURE — 96375 TX/PRO/DX INJ NEW DRUG ADDON: CPT

## 2020-10-31 PROCEDURE — 93010 ELECTROCARDIOGRAM REPORT: CPT

## 2020-10-31 PROCEDURE — 85025 COMPLETE CBC W/AUTO DIFF WBC: CPT

## 2020-10-31 PROCEDURE — 81001 URINALYSIS AUTO W/SCOPE: CPT

## 2020-10-31 PROCEDURE — 84484 ASSAY OF TROPONIN QUANT: CPT

## 2020-10-31 PROCEDURE — 76705 ECHO EXAM OF ABDOMEN: CPT | Mod: 26

## 2020-10-31 PROCEDURE — 76705 ECHO EXAM OF ABDOMEN: CPT

## 2020-10-31 PROCEDURE — 83690 ASSAY OF LIPASE: CPT

## 2020-10-31 PROCEDURE — 71046 X-RAY EXAM CHEST 2 VIEWS: CPT

## 2020-10-31 PROCEDURE — 99284 EMERGENCY DEPT VISIT MOD MDM: CPT | Mod: 25

## 2020-10-31 PROCEDURE — 83735 ASSAY OF MAGNESIUM: CPT

## 2020-10-31 PROCEDURE — 80053 COMPREHEN METABOLIC PANEL: CPT

## 2020-10-31 PROCEDURE — 99285 EMERGENCY DEPT VISIT HI MDM: CPT

## 2020-10-31 PROCEDURE — 81025 URINE PREGNANCY TEST: CPT

## 2020-10-31 RX ORDER — OXYCODONE HYDROCHLORIDE 5 MG/1
1 TABLET ORAL
Qty: 8 | Refills: 0
Start: 2020-10-31 | End: 2020-11-01

## 2020-10-31 RX ORDER — FAMOTIDINE 10 MG/ML
20 INJECTION INTRAVENOUS ONCE
Refills: 0 | Status: COMPLETED | OUTPATIENT
Start: 2020-10-31 | End: 2020-10-31

## 2020-10-31 RX ORDER — ONDANSETRON 8 MG/1
4 TABLET, FILM COATED ORAL ONCE
Refills: 0 | Status: COMPLETED | OUTPATIENT
Start: 2020-10-31 | End: 2020-10-31

## 2020-10-31 RX ORDER — MORPHINE SULFATE 50 MG/1
2 CAPSULE, EXTENDED RELEASE ORAL ONCE
Refills: 0 | Status: DISCONTINUED | OUTPATIENT
Start: 2020-10-31 | End: 2020-10-31

## 2020-10-31 RX ORDER — LIDOCAINE 4 G/100G
10 CREAM TOPICAL ONCE
Refills: 0 | Status: COMPLETED | OUTPATIENT
Start: 2020-10-31 | End: 2020-10-31

## 2020-10-31 RX ADMIN — FAMOTIDINE 20 MILLIGRAM(S): 10 INJECTION INTRAVENOUS at 05:23

## 2020-10-31 RX ADMIN — ONDANSETRON 4 MILLIGRAM(S): 8 TABLET, FILM COATED ORAL at 06:00

## 2020-10-31 RX ADMIN — MORPHINE SULFATE 2 MILLIGRAM(S): 50 CAPSULE, EXTENDED RELEASE ORAL at 06:00

## 2020-10-31 RX ADMIN — Medication 30 MILLILITER(S): at 05:23

## 2020-10-31 RX ADMIN — LIDOCAINE 10 MILLILITER(S): 4 CREAM TOPICAL at 05:47

## 2020-10-31 NOTE — ED ADULT NURSE REASSESSMENT NOTE - NS ED NURSE REASSESS COMMENT FT1
Report received from Susanne Wynne RN. Pt remains AAOx4, NAD, resp nonlabored, skin warm/dry, resting comfortably in bed with call bell at bedside. Pt denies headache, dizziness, chest pain, palpitations, SOB, n/v/d, urinary symptoms, fevers, chills, weakness at this time. Pt awaiting US. Safety maintained. Report received from Susanne Wynne RN. Pt remains AAOx4, NAD, resp nonlabored, skin warm/dry, resting comfortably in bed with call bell at bedside. Pt denies headache, dizziness, chest pain, palpitations, SOB, n/v/d, urinary symptoms, fevers, chills, weakness at this time. Pt awaiting US, pt is aware a urine sample is needed. Safety maintained.

## 2020-10-31 NOTE — ED PROVIDER NOTE - PHYSICAL EXAMINATION
GEN: Patient awake alert NAD.   HEENT: normocephalic, atraumatic, EOMI, no scleral icterus, moist MM  CARDIAC: RRR, S1, S2, no murmur.   PULM: CTA B/L no wheeze, rhonchi, rales.   ABD: + epigastric ad RUQ ttp, + campbell sign. soft, no rebound no guarding  MSK: Moving all extremities, no edema.   NEURO: A&Ox3, gait normal, no focal neurological deficits, CN 2-12 grossly intact  SKIN: warm, dry, no rash.

## 2020-10-31 NOTE — ED PROVIDER NOTE - PATIENT PORTAL LINK FT
You can access the FollowMyHealth Patient Portal offered by [facility] by registering at the following website: [email or web address]. By joining [employer]’s FollowMyHealth portal, you will also be able to view your health information using other applications (apps) compatible with our system.

## 2020-10-31 NOTE — ED PROVIDER NOTE - PMH
H pylori ulcer    Lumbar disc herniation  physical therapy  Patient is Jainism  not completely baptized, will accept blood products at this time, patient doesn't have health care proxy  Submucous leiomyoma of uterus

## 2020-10-31 NOTE — ED PROVIDER NOTE - NSFOLLOWUPCLINICS_GEN_ALL_ED_FT
[facility]  General Surgery  [address] [place] [address] - 3rd [place] [address]  Phone: [phone]  Fax:   Follow Up Time: Urgent

## 2020-10-31 NOTE — ED ADULT NURSE NOTE - OBJECTIVE STATEMENT
40 y F presents to the ED with abdominal pain, generalized for the last 4 days worsening over the last few hours. Reports that she has had pain like this before and seen a doctor about it. reports that she is having soft yellow bowel movements. reports that she feels distended. tender in the RUQ. reports that pain is in back as well. Reports that it hurts more when she lays down. reports some nausea but denies vomiting. On assessment, A&Ox4. Denies lightheadedness, dizziness, headaches, numbness and tingling. Breathing spontaneously and unlabored on Room air. Denies cough, SOB and CP. No Peripheral edema. Cap refill 2s. No JVD. Peripheral pulses strong and equal bilaterally. On cardiac monitor, NSR. Denies CP, SOB and palpitations. Abdomen soft, negative CVA tenderness, positive bowel sounds in all four quadrants. Pt is continent. Denies dysuria, melena and hematuria. Pt safety maintained. Call bell within reach. Side rails in upward position. Seen and evaluated by MD. Awaiting dispo.

## 2020-10-31 NOTE — ED PROVIDER NOTE - NS ED ROS FT
GENERAL: No fever, chills  EYES: no vision changes, no discharge.   ENT: no difficulty swallowing or speaking   CARDIAC: no chest pain/pressure, SOB, lower extremity swelling  PULMONARY: no cough, SOB  GI: + abdominal pain, +n, no v/d  : no dysuria, no hematuria  SKIN: no rashes, no ecchymosis  NEURO: no headache, lightheadedness  MSK: No joint pain, myalgia, weakness.

## 2020-10-31 NOTE — ED PROVIDER NOTE - ATTENDING CONTRIBUTION TO CARE
------------ATTENDING NOTE------------  pt w/  c/o waking up past 4 nights w/ constant waxing/waning moderate ache in epigastrium, radiating to back, similar but worse than past GERD/PUD, no melena/hematochezia, no SOB/dyspnea, no exertional symptoms, nml VS on arrival, clear chest w/o distress, nml cardiac exam, equal distal pulses, mild epigastric tenderness, awaiting labs/imaging and close reassessments -->  - Niles Benitez MD    ---------------------------------------------- ------------ATTENDING NOTE------------  pt w/  c/o waking up past 4 nights w/ constant waxing/waning moderate ache in epigastrium, radiating to back, similar but worse than past GERD/PUD, no melena/hematochezia, no SOB/dyspnea, no exertional symptoms, nml VS on arrival, clear chest w/o distress, nml cardiac exam, equal distal pulses, mild epigastric tenderness, awaiting labs/imaging and close reassessments --> remaining HD stable, benign repeat exams, pending US and close reassessments for disposition decision.  - Niles Benitez MD    ----------------------------------------------

## 2020-10-31 NOTE — ED ADULT NURSE REASSESSMENT NOTE - NS ED NURSE REASSESS COMMENT FT1
Pt remains AAOx4, NAD, resp nonlabored, resting comfortably in bed with family at bedside. Pt states her pain has dissipated since being medicated previously. Pt denies headache, dizziness, chest pain, palpitations, SOB, n/v/d, urinary symptoms, fevers, chills, weakness at this time. Pt awaiting US results and dispo. Safety maintained.

## 2020-10-31 NOTE — ED PROVIDER NOTE - NSFOLLOWUPINSTRUCTIONS_ED_ALL_ED_FT
-You were seen in the Emergency Department (ED) for Gall Stones. Lab and imaging results, if performed, were discussed with you along with your discharge diagnosis.    PLEASE FOLLOW UP WITH YOUR GENERAL SURGERY DOCTOR WITHIN THE NEXT 2 DAYS. A NUMBER HAS BEEN INCLUDED FOR YOUR CONVENIENCE. PAIN MEDICATIONS HAVE BEEN PRESCRIBED TO YOUR PHARMACY. PLEASE TAKE AS NEEDED.    MEDICATIONS:  -Continue all other prescribed medicine, IF ANY, as per your primary care doctor's (PMD) recommendations.    PAIN CONTROL:  -Please take over the counter Tylenol (also known as acetaminophen) 650mg every 6 hours or Ibuprofen (also known as motrin, advil) 600mg every 8 hour for your pain, IF ANY, unless you are not supposed to for any reason.  -Rest, stay hydrated with plenty of fluids (drink at least 2 Liters or 64 Ounces of water each day UNLESS you are supposed to restrict fluids or ANY reason.    FOLLOW-UP:  -Please follow up with your PMD if symptoms return or for any concerning matter pertaining to your Gall Stones.  -Please follow up with your private physician within the next 72 hours. Tell them you were recently in the ED for an urgent issue and would like to be seen. Bring copies of your results if you were given.   -If you do not have a PMD, please call [phone] to find one convenient for you or call our clinic at [phone].    RETURN PRECAUTIONS:  -Please return to the Emergency Department if you experience ANY new or concerning symptoms, such as, but not limited to: worsening pain, large amount of bleeding, passing out, fever >100.F, shaking chills, inability to see or new double vision, chest pain, difficulty breathing, diffuse abdominal pain, unable to eat or drink, continuous vomiting or diarrhea, unable to move or feel part of your body

## 2020-10-31 NOTE — ED PROVIDER NOTE - OBJECTIVE STATEMENT
39 yo F pmhx of PUD, C section, pw epigastric and RUQ pain x 3 days. Pain is severe, worse at night, dull, squeezing, radiates to back, not associated with SOB, no dark tarry stools, lightheadedness. Denies fever, chills, urinary symptoms. 41 yo F pmhx of PUD, C section, pw epigastric and RUQ pain x 4 days. Pain is severe, worse at night, dull, squeezing, radiates to back, not associated with SOB, no dark tarry stools, lightheadedness. Denies fever, chills, urinary symptoms.

## 2020-10-31 NOTE — ED PROVIDER NOTE - PROGRESS NOTE DETAILS
Jo Dominique M.D. Resident   Pt sp viscous lido, no worse epigastric pain and nausea, morphine and zofran ordered. RN aware. Jo Dominique M.D. Resident  PT reassessed, pain and nausea much improved, pt appears comfortable. Pending xray and RUQ sono. Triston Agustin: Patient signed out to me. pending RUQ nayano

## 2020-10-31 NOTE — ED ADULT NURSE REASSESSMENT NOTE - NS ED NURSE REASSESS COMMENT FT1
Report received from Lourdes DEL VALLE. Pt A&Ox4, resting comfortably with no complaints. Nikole FAROOQ at bedside updating pt that she is waiting on US results for d/c home.

## 2020-11-03 ENCOUNTER — NON-APPOINTMENT (OUTPATIENT)
Age: 40
End: 2020-11-03

## 2020-11-10 ENCOUNTER — INPATIENT (INPATIENT)
Facility: HOSPITAL | Age: 40
LOS: 6 days | Discharge: HOME | DRG: 419 | End: 2020-11-17
Attending: SURGERY | Admitting: HOSPITALIST
Payer: COMMERCIAL

## 2020-11-10 VITALS
DIASTOLIC BLOOD PRESSURE: 86 MMHG | HEIGHT: 64 IN | HEART RATE: 75 BPM | TEMPERATURE: 98 F | OXYGEN SATURATION: 100 % | WEIGHT: 179.9 LBS | SYSTOLIC BLOOD PRESSURE: 138 MMHG | RESPIRATION RATE: 18 BRPM

## 2020-11-10 DIAGNOSIS — K27.9 PEPTIC ULCER, SITE UNSPECIFIED, UNSPECIFIED AS ACUTE OR CHRONIC, WITHOUT HEMORRHAGE OR PERFORATION: ICD-10-CM

## 2020-11-10 DIAGNOSIS — Z29.9 ENCOUNTER FOR PROPHYLACTIC MEASURES, UNSPECIFIED: ICD-10-CM

## 2020-11-10 DIAGNOSIS — R10.9 UNSPECIFIED ABDOMINAL PAIN: ICD-10-CM

## 2020-11-10 DIAGNOSIS — R74.01 ELEVATION OF LEVELS OF LIVER TRANSAMINASE LEVELS: ICD-10-CM

## 2020-11-10 DIAGNOSIS — D17.1 BENIGN LIPOMATOUS NEOPLASM OF SKIN AND SUBCUTANEOUS TISSUE OF TRUNK: Chronic | ICD-10-CM

## 2020-11-10 DIAGNOSIS — Z90.13 ACQUIRED ABSENCE OF BILATERAL BREASTS AND NIPPLES: Chronic | ICD-10-CM

## 2020-11-10 DIAGNOSIS — Z98.890 OTHER SPECIFIED POSTPROCEDURAL STATES: Chronic | ICD-10-CM

## 2020-11-10 DIAGNOSIS — Z30.2 ENCOUNTER FOR STERILIZATION: Chronic | ICD-10-CM

## 2020-11-10 LAB
ALBUMIN SERPL ELPH-MCNC: 4 G/DL — SIGNIFICANT CHANGE UP (ref 3.3–5)
ALP SERPL-CCNC: 109 U/L — SIGNIFICANT CHANGE UP (ref 40–120)
ALT FLD-CCNC: 360 U/L — HIGH (ref 10–45)
ANION GAP SERPL CALC-SCNC: 12 MMOL/L — SIGNIFICANT CHANGE UP (ref 5–17)
APAP SERPL-MCNC: <15 UG/ML — SIGNIFICANT CHANGE UP (ref 10–30)
APTT BLD: 27.1 SEC — LOW (ref 27.5–35.5)
AST SERPL-CCNC: 719 U/L — HIGH (ref 10–40)
BASOPHILS # BLD AUTO: 0.03 K/UL — SIGNIFICANT CHANGE UP (ref 0–0.2)
BASOPHILS NFR BLD AUTO: 0.6 % — SIGNIFICANT CHANGE UP (ref 0–2)
BILIRUB SERPL-MCNC: 0.3 MG/DL — SIGNIFICANT CHANGE UP (ref 0.2–1.2)
BUN SERPL-MCNC: 9 MG/DL — SIGNIFICANT CHANGE UP (ref 7–23)
CALCIUM SERPL-MCNC: 9 MG/DL — SIGNIFICANT CHANGE UP (ref 8.4–10.5)
CHLORIDE SERPL-SCNC: 107 MMOL/L — SIGNIFICANT CHANGE UP (ref 96–108)
CMV IGM FLD-ACNC: <8 AU/ML — SIGNIFICANT CHANGE UP
CMV IGM SERPL QL: NEGATIVE — SIGNIFICANT CHANGE UP
CO2 SERPL-SCNC: 24 MMOL/L — SIGNIFICANT CHANGE UP (ref 22–31)
CREAT SERPL-MCNC: 0.58 MG/DL — SIGNIFICANT CHANGE UP (ref 0.5–1.3)
EBV EA AB SER IA-ACNC: <5 U/ML — SIGNIFICANT CHANGE UP
EBV EA AB TITR SER IF: POSITIVE
EBV EA IGG SER-ACNC: NEGATIVE — SIGNIFICANT CHANGE UP
EBV NA IGG SER IA-ACNC: 132 U/ML — HIGH
EBV PATRN SPEC IB-IMP: SIGNIFICANT CHANGE UP
EBV VCA IGG AVIDITY SER QL IA: POSITIVE
EBV VCA IGM SER IA-ACNC: <10 U/ML — SIGNIFICANT CHANGE UP
EBV VCA IGM SER IA-ACNC: >750 U/ML — HIGH
EBV VCA IGM TITR FLD: NEGATIVE — SIGNIFICANT CHANGE UP
EOSINOPHIL # BLD AUTO: 0.02 K/UL — SIGNIFICANT CHANGE UP (ref 0–0.5)
EOSINOPHIL NFR BLD AUTO: 0.4 % — SIGNIFICANT CHANGE UP (ref 0–6)
GAS PNL BLDV: SIGNIFICANT CHANGE UP
GLUCOSE SERPL-MCNC: 104 MG/DL — HIGH (ref 70–99)
HAV IGM SER-ACNC: SIGNIFICANT CHANGE UP
HBV CORE IGM SER-ACNC: SIGNIFICANT CHANGE UP
HBV SURFACE AG SER-ACNC: SIGNIFICANT CHANGE UP
HCG SERPL-ACNC: <2 MIU/ML — SIGNIFICANT CHANGE UP
HCT VFR BLD CALC: 36.5 % — SIGNIFICANT CHANGE UP (ref 34.5–45)
HCV AB S/CO SERPL IA: 0.1 S/CO — SIGNIFICANT CHANGE UP (ref 0–0.99)
HCV AB SERPL-IMP: SIGNIFICANT CHANGE UP
HGB BLD-MCNC: 12.3 G/DL — SIGNIFICANT CHANGE UP (ref 11.5–15.5)
IMM GRANULOCYTES NFR BLD AUTO: 0.2 % — SIGNIFICANT CHANGE UP (ref 0–1.5)
INR BLD: 0.91 RATIO — SIGNIFICANT CHANGE UP (ref 0.88–1.16)
LIDOCAIN IGE QN: 28 U/L — SIGNIFICANT CHANGE UP (ref 7–60)
LYMPHOCYTES # BLD AUTO: 2.56 K/UL — SIGNIFICANT CHANGE UP (ref 1–3.3)
LYMPHOCYTES # BLD AUTO: 48.7 % — HIGH (ref 13–44)
MCHC RBC-ENTMCNC: 28.7 PG — SIGNIFICANT CHANGE UP (ref 27–34)
MCHC RBC-ENTMCNC: 33.7 GM/DL — SIGNIFICANT CHANGE UP (ref 32–36)
MCV RBC AUTO: 85.1 FL — SIGNIFICANT CHANGE UP (ref 80–100)
MONOCYTES # BLD AUTO: 0.32 K/UL — SIGNIFICANT CHANGE UP (ref 0–0.9)
MONOCYTES NFR BLD AUTO: 6.1 % — SIGNIFICANT CHANGE UP (ref 2–14)
NEUTROPHILS # BLD AUTO: 2.32 K/UL — SIGNIFICANT CHANGE UP (ref 1.8–7.4)
NEUTROPHILS NFR BLD AUTO: 44 % — SIGNIFICANT CHANGE UP (ref 43–77)
NRBC # BLD: 0 /100 WBCS — SIGNIFICANT CHANGE UP (ref 0–0)
PLATELET # BLD AUTO: 243 K/UL — SIGNIFICANT CHANGE UP (ref 150–400)
POTASSIUM SERPL-MCNC: 3.6 MMOL/L — SIGNIFICANT CHANGE UP (ref 3.5–5.3)
POTASSIUM SERPL-SCNC: 3.6 MMOL/L — SIGNIFICANT CHANGE UP (ref 3.5–5.3)
PROT SERPL-MCNC: 7.1 G/DL — SIGNIFICANT CHANGE UP (ref 6–8.3)
PROTHROM AB SERPL-ACNC: 11 SEC — SIGNIFICANT CHANGE UP (ref 10.6–13.6)
RBC # BLD: 4.29 M/UL — SIGNIFICANT CHANGE UP (ref 3.8–5.2)
RBC # FLD: 13.2 % — SIGNIFICANT CHANGE UP (ref 10.3–14.5)
SARS-COV-2 RNA SPEC QL NAA+PROBE: SIGNIFICANT CHANGE UP
SODIUM SERPL-SCNC: 143 MMOL/L — SIGNIFICANT CHANGE UP (ref 135–145)
WBC # BLD: 5.26 K/UL — SIGNIFICANT CHANGE UP (ref 3.8–10.5)
WBC # FLD AUTO: 5.26 K/UL — SIGNIFICANT CHANGE UP (ref 3.8–10.5)

## 2020-11-10 PROCEDURE — 99285 EMERGENCY DEPT VISIT HI MDM: CPT

## 2020-11-10 PROCEDURE — 99223 1ST HOSP IP/OBS HIGH 75: CPT | Mod: GC

## 2020-11-10 PROCEDURE — 93010 ELECTROCARDIOGRAM REPORT: CPT

## 2020-11-10 PROCEDURE — 76705 ECHO EXAM OF ABDOMEN: CPT | Mod: 26,RT

## 2020-11-10 RX ORDER — SODIUM CHLORIDE 9 MG/ML
1000 INJECTION INTRAMUSCULAR; INTRAVENOUS; SUBCUTANEOUS ONCE
Refills: 0 | Status: COMPLETED | OUTPATIENT
Start: 2020-11-10 | End: 2020-11-10

## 2020-11-10 RX ORDER — MORPHINE SULFATE 50 MG/1
2 CAPSULE, EXTENDED RELEASE ORAL ONCE
Refills: 0 | Status: DISCONTINUED | OUTPATIENT
Start: 2020-11-10 | End: 2020-11-10

## 2020-11-10 RX ORDER — MORPHINE SULFATE 50 MG/1
2 CAPSULE, EXTENDED RELEASE ORAL EVERY 6 HOURS
Refills: 0 | Status: DISCONTINUED | OUTPATIENT
Start: 2020-11-10 | End: 2020-11-12

## 2020-11-10 RX ORDER — ONDANSETRON 8 MG/1
4 TABLET, FILM COATED ORAL ONCE
Refills: 0 | Status: COMPLETED | OUTPATIENT
Start: 2020-11-10 | End: 2020-11-10

## 2020-11-10 RX ORDER — OMEPRAZOLE 10 MG/1
1 CAPSULE, DELAYED RELEASE ORAL
Qty: 0 | Refills: 0 | DISCHARGE

## 2020-11-10 RX ORDER — MORPHINE SULFATE 50 MG/1
2 CAPSULE, EXTENDED RELEASE ORAL EVERY 6 HOURS
Refills: 0 | Status: DISCONTINUED | OUTPATIENT
Start: 2020-11-10 | End: 2020-11-10

## 2020-11-10 RX ORDER — ACETAMINOPHEN 500 MG
2 TABLET ORAL
Qty: 0 | Refills: 0 | DISCHARGE

## 2020-11-10 RX ORDER — OXYCODONE HYDROCHLORIDE 5 MG/1
5 TABLET ORAL EVERY 6 HOURS
Refills: 0 | Status: DISCONTINUED | OUTPATIENT
Start: 2020-11-10 | End: 2020-11-10

## 2020-11-10 RX ORDER — MORPHINE SULFATE 50 MG/1
4 CAPSULE, EXTENDED RELEASE ORAL ONCE
Refills: 0 | Status: DISCONTINUED | OUTPATIENT
Start: 2020-11-10 | End: 2020-11-10

## 2020-11-10 RX ADMIN — MORPHINE SULFATE 4 MILLIGRAM(S): 50 CAPSULE, EXTENDED RELEASE ORAL at 08:03

## 2020-11-10 RX ADMIN — MORPHINE SULFATE 4 MILLIGRAM(S): 50 CAPSULE, EXTENDED RELEASE ORAL at 09:35

## 2020-11-10 RX ADMIN — SODIUM CHLORIDE 1000 MILLILITER(S): 9 INJECTION INTRAMUSCULAR; INTRAVENOUS; SUBCUTANEOUS at 10:46

## 2020-11-10 RX ADMIN — MORPHINE SULFATE 2 MILLIGRAM(S): 50 CAPSULE, EXTENDED RELEASE ORAL at 20:37

## 2020-11-10 RX ADMIN — MORPHINE SULFATE 2 MILLIGRAM(S): 50 CAPSULE, EXTENDED RELEASE ORAL at 13:16

## 2020-11-10 RX ADMIN — ONDANSETRON 4 MILLIGRAM(S): 8 TABLET, FILM COATED ORAL at 08:03

## 2020-11-10 RX ADMIN — MORPHINE SULFATE 2 MILLIGRAM(S): 50 CAPSULE, EXTENDED RELEASE ORAL at 20:00

## 2020-11-10 NOTE — ED ADULT NURSE REASSESSMENT NOTE - NS ED NURSE REASSESS COMMENT FT1
Patient updated on plan of care. Call bell within reach. Patient awaiting transport to [address] [place] [address].

## 2020-11-10 NOTE — ED PROVIDER NOTE - OBJECTIVE STATEMENT
39 yo female with pmhx of h. pylori gastritis, gallstones, hysterectomy, presenting with 1 day of right sided abdominal pain. patient states she ate sloppy jorge last night, and since then, has had upper and right abdominal pain, persistent, unreleived by oxycodone at home. Came to ED for further evaluation. Had similar pain 1-2 weeks ago, came to ED, dx with gallstones, pain controlled and is to f/u with outpatient surgery on 11/18. Also endorsing nausea.    Denies vomiting, CP, SOB, fevers, chills

## 2020-11-10 NOTE — ED PROVIDER NOTE - ATTENDING CONTRIBUTION TO CARE
40 F w. hx of gallstones, outpt follow up with Dr. De Dios in mid november, here w/ epigastric abd pain. No cp, no sob, + nausea no vomiting pain is sharp in the epigastric area, radiating to the back w/ no fevers, no chills.   on exam, pt is uncomfortable, is intermittently moaning w/ RUQ tenderness. no RLQ tenderness. lung clear 2+ radial and DP pulse no lower extremity edema.   concern for gall stone pancreatitis vs cholelithiasis.   pt will have labs, US and possible sx consult

## 2020-11-10 NOTE — H&P ADULT - HISTORY OF PRESENT ILLNESS
40 year old female with PMHx of H. pylori gastritis, fibroids s/p hysterectomy 2017, and recently diagnosed gallstones, who now presents with right upper quadrant abdominal pain x1 day. Patient was seen in the ED on 10/31 for similar pain, where she was diagnosed with gallstones on ultrasound and sent home with oxycodone with scheduled elective cholecystectomy. She had been at her baseline state of health until last night, when she began experiencing right upper quadrant pain 3 hours after eating a sandwich. Describes the pain as 10/10, in the right upper quadrant radiating to the back with some radiation up to the right shoulder and right arm. Associated with nausea, no vomiting. Took oxycodone around 1 am which put her to sleep but did not improve the pain. Denies fevers, chills, diarrhea, constipation, chest pain, shortness of breath, dysuria or hematuria. 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 Denies smoking, drinking, or drugs. 40 year old female with PMHx of H. pylori gastritis, fibroids s/p hysterectomy 2017, and recently diagnosed gallstones, who now presents with right upper quadrant abdominal pain x1 day. Patient was seen in the ED on 10/31 for similar pain, where she was diagnosed with gallstones on ultrasound and sent home with oxycodone with scheduled elective cholecystectomy. She had been at her baseline state of health until last night, when she began experiencing right upper quadrant pain 3 hours after eating a sandwich/pistacchios. Describes the pain as 10/10, in the right upper quadrant radiating to the back with some radiation up to the right shoulder and right arm. Associated with nausea, no vomiting. Took oxycodone around 1 am which put her to sleep but did not improve the pain. Patient has been having this pain for a few weeks now but its is increasing in duration. She is unable to identify a direct trigger. Has history of chronic diarrhea with concern for IBS. Denies fevers, chills, diarrhea, constipation, chest pain, shortness of breath, dysuria or hematuria. 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 Denies smoking, drinking, or drugs.    ED Vitals: T97.9, HR 71, /84, RR 20, SpO2 100  In the ED: s/p morphine 2mg x1, morphine 4mg IV x1, zofran 4mg IVx 1, 1L NS

## 2020-11-10 NOTE — ED PROVIDER NOTE - PMH
H pylori ulcer    Lumbar disc herniation  physical therapy  Patient is [employer]  not completely baptized, will accept blood products at this time, patient doesn't have health care proxy  Submucous leiomyoma of uterus

## 2020-11-10 NOTE — H&P ADULT - NSHPSOCIALHISTORY_GEN_ALL_CORE
Denies smoking, drinking, drug use. Lives with  and daughters. Works as a nurse. Able to take care of ADLs.

## 2020-11-10 NOTE — H&P ADULT - PROBLEM SELECTOR PLAN 2
- AST and ALT elevated to 719/360 from 24/12 on 10/31  - unclear etiology (DDX includes hepatitis from infectious/autoimmune/drugs)  - consider CTAP vs MRCP  - GI/Hep consult

## 2020-11-10 NOTE — ED ADULT NURSE NOTE - OBJECTIVE STATEMENT
39 y/o female coming to the ER rom home with a c/o abdominal pain. A&Ox3. Ambulatory. PMH H.pylori, and hysterectomy. Patient reports being dx with gallstones around october 31st and told to follow up with surgery, patient has a scheduled appointment with the surgeon on november 18th. Patient reports having worsening pain in the RUQ radiating to the back with nausea starting at midnight. Patient reports taking one oxycodone around 0100. Patient rates current pain 10/10. Lung sounds clear b/l. Capillary refill less than 2 seconds. Abdomen is soft and tender in the RUQ. Patient denies chest pain, SOB, headache, n/v/d, urinary symptoms. MD at the bedside. VSS. Safety measures maintained. Will continue to monitor. 39 y/o female coming to the ER rom home with a c/o abdominal pain. A&Ox3. Ambulatory. PMH H.pylori, and hysterectomy. Patient reports being dx with gallstones around october 31st and told to follow up with surgery, patient has a scheduled appointment with the surgeon on november 18th. Patient reports having worsening pain in the RUQ radiating to the back with nausea starting at midnight. Patient reports taking one oxycodone around 0100 with no relief. Patient rates current pain 10/10. Lung sounds clear b/l. Capillary refill less than 2 seconds. Abdomen is soft and tender in the RUQ. Patient denies chest pain, SOB, headache, n/v/d, urinary symptoms. MD at the bedside. VSS. Safety measures maintained. Will continue to monitor.

## 2020-11-10 NOTE — H&P ADULT - NSHPLABSRESULTS_GEN_ALL_CORE
12.3   5.26  )-----------( 243      ( 10 Nov 2020 08:14 )             36.5   11-10    143  |  107  |  9   ----------------------------<  104<H>  3.6   |  24  |  0.58    Ca    9.0      10 Nov 2020 08:14    TPro  7.1  /  Alb  4.0  /  TBili  0.3  /  DBili  x   /  AST  719<H>  /  ALT  360<H>  /  AlkPhos  109  11-10    PT/INR - ( 10 Nov 2020 12:28 )   PT: 11.0 sec;   INR: 0.91 ratio    PTT - ( 10 Nov 2020 12:28 )  PTT:27.1 sec    Lipase: 28  Acetaminophen: <15      < from: US Abdomen Upper Quadrant Right (11.10.20 @ 08:59) >    IMPRESSION:    No biliary ductal dilation. Cholelithiasis with negative sonographic Gabriel sign and no gallbladder wall thickening.    < end of copied text >

## 2020-11-10 NOTE — ED PROVIDER NOTE - CLINICAL SUMMARY MEDICAL DECISION MAKING FREE TEXT BOX
39 yo female with pmhx of h. pylori gastritis, gallstones, hysterectomy, presenting with 1 day of right sided abdominal pain. suggestive of cholecystitis vs gastritis, will evaluate with ekg, cbc cmp lipase us abdomen, will give zofran, morphine and will reassess. currently low likelihood of other intraabdominal pathology, but will reassess.

## 2020-11-10 NOTE — ED PROVIDER NOTE - GASTROINTESTINAL, MLM
RUQ ttp, negative campbell's sign, right midgastric ttp, no guarding or rebound. nondistended abdomen.

## 2020-11-10 NOTE — ED ADULT TRIAGE NOTE - CHIEF COMPLAINT QUOTE
RUQ pain radiating to back, +nausea, took oxycodone at 0100AM without relief per pt, diagnosed with gallstones one week ago, worsening of pain since, denies fever/chills

## 2020-11-10 NOTE — H&P ADULT - NSHPPHYSICALEXAM_GEN_ALL_CORE
VITALS:   Vital Signs Last 24 Hrs  T(C): 36.8 (10 Nov 2020 12:33), Max: 36.8 (10 Nov 2020 07:30)  T(F): 98.2 (10 Nov 2020 12:33), Max: 98.3 (10 Nov 2020 07:30)  HR: 71 (10 Nov 2020 12:33) (71 - 84)  BP: 109/73 (10 Nov 2020 13:20) (109/73 - 138/86)  BP(mean): --  RR: 17 (10 Nov 2020 12:33) (17 - 20)  SpO2: 100% (10 Nov 2020 12:33) (100% - 100%)    GENERAL: NAD, lying in bed comfortably  HEAD:  Atraumatic, Normocephalic  EYES: EOMI, PERRLA, conjunctiva and sclera clear  ENT: Moist mucous membranes  NECK: Supple, No JVD  CHEST/LUNG: Clear to auscultation bilaterally; No rales, rhonchi, wheezing, or rubs. Unlabored respirations  HEART: Regular rate and rhythm; No murmurs, rubs, or gallops  ABDOMEN: Bowel sounds present; RUQ TTP; campbell's negative  EXTREMITIES:  2+ Peripheral Pulses, brisk capillary refill. No clubbing, cyanosis, or edema  NERVOUS SYSTEM:  Alert & Oriented X3, speech clear. No deficits   MSK: FROM all 4 extremities, full and equal strength  SKIN: No rashes or lesions

## 2020-11-10 NOTE — ED PROVIDER NOTE - PROGRESS NOTE DETAILS
Angel Petersen PGY3  us shows stones. has new lft elevations. will admit for further eval for liver dysfunction

## 2020-11-10 NOTE — ED ADULT NURSE REASSESSMENT NOTE - NS ED NURSE REASSESS COMMENT FT1
VSS. Patient resting in the stretcher. Patient reports worsening pain, rated a 6/10 in the RUQ. Administered morphine as prescribed will reassess pain. Patient awaiting inpatient bed placement.

## 2020-11-10 NOTE — H&P ADULT - ATTENDING COMMENTS
Agree with resident documentation above except as noted:  40F with hx of h pylori gastritis, hysterectomy and gallstones presents with acute abdominal pain, found to have marked transaminitis with 2:1 AST to ALT pattern. No concurrent bilirubin, alk phos, INR elevation, and liver U/S negative for acute cholecystitis. Acetaminophen level low.    Active problem:  Markedly elevated transaminases without acute liver failure    Plan:  -GI consult  -trend LFTs  -avoid hepatotoxins  -obtain EBV, CMV, hepatitis panel, vzv, hsv  -will consider further imaging per GI recs

## 2020-11-10 NOTE — H&P ADULT - ASSESSMENT
40 year old female with PMHx of H. pylori gastritis, fibroids s/p hysterectomy 2017, and recently diagnosed gallstones, who now presents with right upper quadrant abdominal pain x1 day with transaminitis likely 2/2 to hepatitis of unclear etiology.

## 2020-11-10 NOTE — ED ADULT TRIAGE NOTE - ARRIVAL FROM
Pt comes in today with complaints of a productive cough, chest congestion, and feeling like has has the flu since Sunday. Pt also believes he has high BP d/t his cheeks being flushed. Pt has not tried OTC medications for this issue. Pt also notes HA in the morning      Patient would like communication of their results via:        Cell Phone:   Telephone Information:   Mobile [phone]     Okay to leave a message containing results? Yes     Home

## 2020-11-10 NOTE — H&P ADULT - PROBLEM SELECTOR PLAN 1
- Presented with recurrent abdominal pain possibly 2/2 to hepatitis vs biliary colic  - being evaluated by general surgery outpt for elective anna  - associated transaminitis w/o hyperbilirubinemia  - RUQ ultrasound show cholelithiasis w/o cholecystitis  - symptomatic pain management  - f/u hepatitis panel  - consult hepatology/GI  - CT vs MRCP - Presented with recurrent abdominal pain possibly 2/2 to hepatitis vs biliary colic  - being evaluated by general surgery outpt for elective anna  - associated transaminitis w/o hyperbilirubinemia  - RUQ ultrasound show cholelithiasis w/o cholecystitis  - symptomatic pain management  - f/u hepatitis panel, added-on CMV IgM, EBV panel, and HSV IgM  - consult hepatology/GI  - CT vs MRCP

## 2020-11-10 NOTE — ED ADULT NURSE REASSESSMENT NOTE - NS ED NURSE REASSESS COMMENT FT1
Patient resting in the stretcher. VSS. Patient reports improvement in her pain and rates her current pain as a 3/10. Awaiting US results. Safety measures maintained. Will continue to monitor.

## 2020-11-10 NOTE — H&P ADULT - NSHPREVIEWOFSYSTEMS_GEN_ALL_CORE
REVIEW OF SYSTEMS:  CONSTITUTIONAL: No weakness, fevers or chills  EYES/ENT: No visual changes;  No vertigo or throat pain   NECK: No pain or stiffness  RESPIRATORY: No cough, wheezing, hemoptysis; No shortness of breath  CARDIOVASCULAR: No chest pain or palpitations  GASTROINTESTINAL: +RUQ pain; + diarrhea, no n/v/hematochezia/melena  GENITOURINARY: No dysuria, frequency or hematuria  NEUROLOGICAL: No numbness or weakness  SKIN: No itching, rashes

## 2020-11-10 NOTE — ED PROVIDER NOTE - PRINCIPAL DIAGNOSIS
CHF Week 1 Survey      Responses   Facility patient discharged from?  [facility]   Does the patient have one of the following disease processes/diagnoses(primary or secondary)?  CHF   Is there a successful TCM telephone encounter documented?  No   CHF Week 1 attempt successful?  No   Unsuccessful attempts  Attempt 4 [NO ANSWER, NO VM]          Jacquelyn Varela LPN  
Abdominal pain, unspecified abdominal location

## 2020-11-10 NOTE — ED ADULT NURSE NOTE - CHPI ED NUR SYMPTOMS NEG
no dysuria/no abdominal distension/no burning urination/no diarrhea/no hematuria/no vomiting/no blood in stool/no chills/no fever

## 2020-11-10 NOTE — H&P ADULT - NSICDXPASTSURGICALHX_GEN_ALL_CORE_FT
PAST SURGICAL HISTORY:  Encounter for Essure implantation s/p essure placement, over 12 years ago    H/O bilateral breast reduction surgery     H/O myomectomy hysteroscopic, 4/2017    Lipoma of back s/p excision

## 2020-11-10 NOTE — H&P ADULT - NSICDXPASTMEDICALHX_GEN_ALL_CORE_FT
PAST MEDICAL HISTORY:  H pylori ulcer     Lumbar disc herniation physical therapy    Patient is [employer] not completely baptized, will accept blood products at this time, patient doesn't have health care proxy    Submucous leiomyoma of uterus

## 2020-11-11 LAB
ALBUMIN SERPL ELPH-MCNC: 3.7 G/DL — SIGNIFICANT CHANGE UP (ref 3.3–5)
ALP SERPL-CCNC: 166 U/L — HIGH (ref 40–120)
ALT FLD-CCNC: 832 U/L — HIGH (ref 10–45)
ANION GAP SERPL CALC-SCNC: 9 MMOL/L — SIGNIFICANT CHANGE UP (ref 5–17)
AST SERPL-CCNC: 670 U/L — HIGH (ref 10–40)
BILIRUB SERPL-MCNC: 1.4 MG/DL — HIGH (ref 0.2–1.2)
BUN SERPL-MCNC: 8 MG/DL — SIGNIFICANT CHANGE UP (ref 7–23)
CALCIUM SERPL-MCNC: 8.8 MG/DL — SIGNIFICANT CHANGE UP (ref 8.4–10.5)
CHLORIDE SERPL-SCNC: 108 MMOL/L — SIGNIFICANT CHANGE UP (ref 96–108)
CO2 SERPL-SCNC: 22 MMOL/L — SIGNIFICANT CHANGE UP (ref 22–31)
CREAT SERPL-MCNC: 0.65 MG/DL — SIGNIFICANT CHANGE UP (ref 0.5–1.3)
GLUCOSE SERPL-MCNC: 80 MG/DL — SIGNIFICANT CHANGE UP (ref 70–99)
HCT VFR BLD CALC: 35.3 % — SIGNIFICANT CHANGE UP (ref 34.5–45)
HGB BLD-MCNC: 11.7 G/DL — SIGNIFICANT CHANGE UP (ref 11.5–15.5)
MCHC RBC-ENTMCNC: 28.5 PG — SIGNIFICANT CHANGE UP (ref 27–34)
MCHC RBC-ENTMCNC: 33.1 GM/DL — SIGNIFICANT CHANGE UP (ref 32–36)
MCV RBC AUTO: 86.1 FL — SIGNIFICANT CHANGE UP (ref 80–100)
NRBC # BLD: 0 /100 WBCS — SIGNIFICANT CHANGE UP (ref 0–0)
PLATELET # BLD AUTO: 233 K/UL — SIGNIFICANT CHANGE UP (ref 150–400)
POTASSIUM SERPL-MCNC: 4.1 MMOL/L — SIGNIFICANT CHANGE UP (ref 3.5–5.3)
POTASSIUM SERPL-SCNC: 4.1 MMOL/L — SIGNIFICANT CHANGE UP (ref 3.5–5.3)
PROT SERPL-MCNC: 6.9 G/DL — SIGNIFICANT CHANGE UP (ref 6–8.3)
RBC # BLD: 4.1 M/UL — SIGNIFICANT CHANGE UP (ref 3.8–5.2)
RBC # FLD: 13.4 % — SIGNIFICANT CHANGE UP (ref 10.3–14.5)
SODIUM SERPL-SCNC: 139 MMOL/L — SIGNIFICANT CHANGE UP (ref 135–145)
WBC # BLD: 3.79 K/UL — LOW (ref 3.8–10.5)
WBC # FLD AUTO: 3.79 K/UL — LOW (ref 3.8–10.5)

## 2020-11-11 PROCEDURE — 99253 IP/OBS CNSLTJ NEW/EST LOW 45: CPT | Mod: GC

## 2020-11-11 PROCEDURE — 99222 1ST HOSP IP/OBS MODERATE 55: CPT | Mod: GC

## 2020-11-11 PROCEDURE — 99233 SBSQ HOSP IP/OBS HIGH 50: CPT | Mod: GC

## 2020-11-11 PROCEDURE — 74183 MRI ABD W/O CNTR FLWD CNTR: CPT | Mod: 26

## 2020-11-11 RX ORDER — SENNA PLUS 8.6 MG/1
2 TABLET ORAL AT BEDTIME
Refills: 0 | Status: DISCONTINUED | OUTPATIENT
Start: 2020-11-11 | End: 2020-11-14

## 2020-11-11 RX ORDER — CEFOTETAN DISODIUM 1 G
2 VIAL (EA) INJECTION EVERY 12 HOURS
Refills: 0 | Status: DISCONTINUED | OUTPATIENT
Start: 2020-11-11 | End: 2020-11-14

## 2020-11-11 RX ORDER — SIMETHICONE 80 MG/1
80 TABLET, CHEWABLE ORAL ONCE
Refills: 0 | Status: COMPLETED | OUTPATIENT
Start: 2020-11-11 | End: 2020-11-11

## 2020-11-11 RX ORDER — PANTOPRAZOLE SODIUM 20 MG/1
40 TABLET, DELAYED RELEASE ORAL
Refills: 0 | Status: DISCONTINUED | OUTPATIENT
Start: 2020-11-11 | End: 2020-11-14

## 2020-11-11 RX ORDER — POLYETHYLENE GLYCOL 3350 17 G/17G
17 POWDER, FOR SOLUTION ORAL DAILY
Refills: 0 | Status: DISCONTINUED | OUTPATIENT
Start: 2020-11-11 | End: 2020-11-14

## 2020-11-11 RX ORDER — FAMOTIDINE 10 MG/ML
20 INJECTION INTRAVENOUS DAILY
Refills: 0 | Status: DISCONTINUED | OUTPATIENT
Start: 2020-11-11 | End: 2020-11-14

## 2020-11-11 RX ORDER — ONDANSETRON 8 MG/1
4 TABLET, FILM COATED ORAL ONCE
Refills: 0 | Status: COMPLETED | OUTPATIENT
Start: 2020-11-11 | End: 2020-11-11

## 2020-11-11 RX ORDER — ONDANSETRON 8 MG/1
4 TABLET, FILM COATED ORAL EVERY 8 HOURS
Refills: 0 | Status: DISCONTINUED | OUTPATIENT
Start: 2020-11-11 | End: 2020-11-14

## 2020-11-11 RX ADMIN — MORPHINE SULFATE 2 MILLIGRAM(S): 50 CAPSULE, EXTENDED RELEASE ORAL at 15:39

## 2020-11-11 RX ADMIN — MORPHINE SULFATE 2 MILLIGRAM(S): 50 CAPSULE, EXTENDED RELEASE ORAL at 23:07

## 2020-11-11 RX ADMIN — MORPHINE SULFATE 2 MILLIGRAM(S): 50 CAPSULE, EXTENDED RELEASE ORAL at 22:36

## 2020-11-11 RX ADMIN — POLYETHYLENE GLYCOL 3350 17 GRAM(S): 17 POWDER, FOR SOLUTION ORAL at 16:41

## 2020-11-11 RX ADMIN — PANTOPRAZOLE SODIUM 40 MILLIGRAM(S): 20 TABLET, DELAYED RELEASE ORAL at 16:41

## 2020-11-11 RX ADMIN — MORPHINE SULFATE 2 MILLIGRAM(S): 50 CAPSULE, EXTENDED RELEASE ORAL at 16:42

## 2020-11-11 RX ADMIN — ONDANSETRON 4 MILLIGRAM(S): 8 TABLET, FILM COATED ORAL at 16:09

## 2020-11-11 RX ADMIN — SENNA PLUS 2 TABLET(S): 8.6 TABLET ORAL at 23:10

## 2020-11-11 RX ADMIN — MORPHINE SULFATE 2 MILLIGRAM(S): 50 CAPSULE, EXTENDED RELEASE ORAL at 06:24

## 2020-11-11 RX ADMIN — Medication 100 GRAM(S): at 17:51

## 2020-11-11 RX ADMIN — FAMOTIDINE 20 MILLIGRAM(S): 10 INJECTION INTRAVENOUS at 16:41

## 2020-11-11 RX ADMIN — ONDANSETRON 4 MILLIGRAM(S): 8 TABLET, FILM COATED ORAL at 06:36

## 2020-11-11 RX ADMIN — MORPHINE SULFATE 2 MILLIGRAM(S): 50 CAPSULE, EXTENDED RELEASE ORAL at 07:10

## 2020-11-11 RX ADMIN — SIMETHICONE 80 MILLIGRAM(S): 80 TABLET, CHEWABLE ORAL at 23:17

## 2020-11-11 NOTE — CONSULT NOTE ADULT - ATTENDING COMMENTS
40yF w cholelithiasis and RUQ, now with slightly elevated LFTs - concern for choledocholithiasis vs sludge though none seen on current imaging.  Please obtain MRCP to better visualize biliary tree  Trend liver tests  NPO after midnight in case MRCP + or rising liver tests for EUS +/- ERCP    Thank you for this interesting consult.  Please call the advanced GI service with any questions or concerns.

## 2020-11-11 NOTE — CONSULT NOTE ADULT - ASSESSMENT
40 year old female with PMHx of H. pylori gastritis, fibroids s/p hysterectomy 2017, recently diagnosed biliary colic now presenting with abdominal pain x3 hours now with transaminitis and bilirubinemia concerning for choledocholithiasis, MRCP negative for CBD obstruction; patient possibly passed stone    - Recommend empiric antibiotics (Cefotetan)  - Trend bilirubin and hepatic function panel  - Appreciate GI input  - May benefit from inpatient cholecystectomy prior to discharge, pending clinical course    d/w Dr. Hale and Dr. Meri Lopez, PGY-3  General Surgery ([facility])  [id number] with questions

## 2020-11-11 NOTE — PROGRESS NOTE ADULT - ATTENDING COMMENTS
Agree with resident documentation above except as noted:    Seen and examined at bedside with team. Still c/o abdominal pain requiring narcotics overnight.  Tolerating diet.  This AM, labs significant for worsening transaminitis, hyperbilirubinemia, elevated alk phos, concerning for hepatobiliary process  Appreciate GI recs: pending MRCP, surgery eval for cholecystectomy  Cont to trend LFTs daily Agree with resident documentation above except as noted:    Seen and examined at bedside with team. Still c/o abdominal pain requiring narcotics overnight.  Tolerating diet.  This AM, labs significant for worsening transaminitis, hyperbilirubinemia, elevated alk phos, concerning for hepatobiliary process  Viral workup negative for acute infection  Appreciate GI recs: pending MRCP, surgery eval for cholecystectomy  Cont to trend LFTs daily

## 2020-11-11 NOTE — CONSULT NOTE ADULT - ASSESSMENT
40F w/ hx of H pylori gastritis s/p treatment, gallstones, presenting w/ 2 weeks of intermittent epigastric/RUQ pain radiating to her back and shoulder. Found to have gallstones w/o biliary ductal dilatation w/ elevated liver enzymes. Likely passed vs retained stone.    Impression:  #Elevated liver enzymes - d/dx choledocholithiasis vs passed stone vs less likely biliary colic.   #Abd pain - d/dx as above; non-biliary etiologies (i.e. PUD, ersoive gastritis, gastroparesis) are less likely    Recommendations:  - MRCP to evaluate bile ducts for retained stones  - Consider EUS/ERCP based on MRCP results  - Surgical consult to evaluate for role of inpatient cholecystectomy (based on above)  - Pain management per primary team  - C/w home PPI and famotidine  - Diet as tolerated for now (low fat)    Giovanni Enriquez  Gastroenterology Fellow  NON-URGENT CONSULTS:  Please email [email or web address]  AT NIGHT AND ON WEEKENDS:  Contact on-call GI fellow via answering service ([phone]) from 5pm-8am and on weekends/holidays  MONDAY-FRIDAY 8AM-5PM:  Available via [employer] Teams  Pager# [phone] ([facility]) or [phone] ([facility])  GI Phone# [phone] ([facility])

## 2020-11-11 NOTE — PROGRESS NOTE ADULT - PROBLEM SELECTOR PLAN 2
- AST and ALT elevated to 719/360 from 24/12 on 10/31  - AST//832  - likely 2/2 to gallstones  - MRCP

## 2020-11-11 NOTE — PROGRESS NOTE ADULT - SUBJECTIVE AND OBJECTIVE BOX
Gomez Zamora  PGY1/Medicine/[id number]/[phone]    ROSENDO FORRESTER  40y  Female      Patient is a 40y old  Female who presents with a chief complaint of RUQ pain w/ transaminitis (11 Nov 2020 09:45)      INTERVAL HPI/OVERNIGHT EVENTS/ROS: 2 episodes of sever pain and nausea requiring morphine. Denies cp/sob/v/f/c/dysuria.     Vital Signs Last 24 Hrs  T(C): 36.7 (11 Nov 2020 05:15), Max: 36.8 (10 Nov 2020 12:33)  T(F): 98.1 (11 Nov 2020 05:15), Max: 98.2 (10 Nov 2020 12:33)  HR: 80 (11 Nov 2020 05:15) (64 - 80)  BP: 117/74 (11 Nov 2020 05:15) (109/73 - 117/74)  BP(mean): --  RR: 18 (11 Nov 2020 05:15) (17 - 20)  SpO2: 99% (11 Nov 2020 05:15) (98% - 100%)    PHYSICAL EXAM:  GENERAL: NAD, lying in bed comfortably  HEAD:  Atraumatic, Normocephalic  EYES: EOMI, PERRLA, conjunctiva and sclera clear  ENT: Moist mucous membranes  NECK: Supple, No JVD  CHEST/LUNG: Clear to auscultation bilaterally; No rales, rhonchi, wheezing, or rubs. Unlabored respirations  HEART: Regular rate and rhythm; No murmurs, rubs, or gallops  ABDOMEN: Bowel sounds present; RUQ TTP; campbell's negative  EXTREMITIES:  2+ Peripheral Pulses, brisk capillary refill. No clubbing, cyanosis, or edema  NERVOUS SYSTEM:  Alert & Oriented X3, speech clear. No deficits   MSK: FROM all 4 extremities, full and equal strength  SKIN: No rashes or lesions    Consultant(s) Notes Reviewed:  [x ] YES  [ ] NO  Care Discussed with Consultants/Other Providers [ x] YES  [ ] NO    LABS:                        11.7   3.79  )-----------( 233      ( 11 Nov 2020 07:15 )             35.3     11-11    139  |  108  |  8   ----------------------------<  80  4.1   |  22  |  0.65    Ca    8.8      11 Nov 2020 07:14    TPro  6.9  /  Alb  3.7  /  TBili  1.4<H>  /  DBili  x   /  AST  670<H>  /  ALT  832<H>  /  AlkPhos  166<H>  11-11    PT/INR - ( 10 Nov 2020 12:28 )   PT: 11.0 sec;   INR: 0.91 ratio         PTT - ( 10 Nov 2020 12:28 )  PTT:27.1 sec      CAPILLARY BLOOD GLUCOSE          RADIOLOGY & ADDITIONAL TESTS:    Imaging Personally Reviewed:  [ ] YES  [ ] NO

## 2020-11-11 NOTE — CONSULT NOTE ADULT - ATTENDING COMMENTS
Patient seen/examined.  Agree w above note and plan and have discussed plan w house staff.  More comfortable.  Will need cholecystectomy    Kaiser Molina MD

## 2020-11-11 NOTE — CONSULT NOTE ADULT - SUBJECTIVE AND OBJECTIVE BOX
Chief Complaint:  Patient is a 40y old  Female who presents with a chief complaint of RUQ pain w/ transaminitis (10 Nov 2020 15:08)    HPI:ROSENDO FORRESTER is a 40y Female w/ hx of H pylori gastritis s/p treatment, recent diagnosis of gallstones, presents w/ 2 weeks of intermittent abd pain. Pt came to ED 10/31 w/ similar pain: described as gnawing pain in epigastrium, RUQ, wrapping to her back and her R shoulder. Pain is intermittent, not associated w/ food or bowel movements. Wakes her up from sleep. Associated w/ some nausea w/o vomiting. She is able to tolerate PO. No change in bowel habits. No f/c. No FHx gallstones.    RUQ U/S on 10/31 showed gallstones and she was scheduled to see surgery as outpatient for cholecystectomy but was unable to get appointment in the meantime.     PMHX/PSHX:  H pylori ulcer  Lumbar disc herniation  Patient is [employer]  Submucous leiomyoma of uterus  Lipoma of back  Encounter for Essure implantation  H/O bilateral breast reduction surgery  No pertinent past medical history  H/O myomectomy  Lipoma of back  Encounter for Essure implantation    Allergies:  No Known Allergies    Home Medications: reviewed  Hospital Medications:  morphine  - Injectable 2 milliGRAM(s) IV Push every 6 hours PRN    Social History:   Tob: Denies  EtOH: Denies  Illicit Drugs: Denies    Family history:    Denies family history of colon cancer/polyps, stomach cancer/polyps, pancreatic cancer/masses, liver cancer/disease, ovarian cancer and endometrial cancer.    ROS:   General:  No  fevers, chills, night sweats, fatigue  Eyes:  Good vision, no reported pain  ENT:  No sore throat, pain, runny nose  CV:  No pain, palpitations  Pulm:  No dyspnea, cough  GI:  See HPI, otherwise negative  :  No  incontinence, nocturia  Muscle:  No pain, weakness  Neuro:  No memory problems  Psych:  No insomnia, mood problems, depression  Endocrine:  No polyuria, polydipsia, cold/heat intolerance  Heme:  No petechiae, ecchymosis, easy bruisability  Skin:  No rash    PHYSICAL EXAM:   Vital Signs:  Vital Signs Last 24 Hrs  T(C): 36.7 (11 Nov 2020 05:15), Max: 36.8 (10 Nov 2020 12:33)  T(F): 98.1 (11 Nov 2020 05:15), Max: 98.2 (10 Nov 2020 12:33)  HR: 80 (11 Nov 2020 05:15) (64 - 80)  BP: 117/74 (11 Nov 2020 05:15) (109/73 - 117/74)  BP(mean): --  RR: 18 (11 Nov 2020 05:15) (17 - 20)  SpO2: 99% (11 Nov 2020 05:15) (98% - 100%)  Daily Height in cm: 162.56 (10 Nov 2020 16:22)    Daily     GENERAL: no acute distress  NEURO: alert, no asterixis  HEENT: anicteric sclera, no conjunctival pallor appreciated  CHEST: no respiratory distress, no accessory muscle use  CARDIAC: regular rate, rhythm  ABDOMEN: soft, minimally tender RUQ, neg gabriel's sign, non-distended, no rebound or guarding  EXTREMITIES: warm, well perfused, no edema  SKIN: no lesions noted    LABS: reviewed                        11.7   3.79  )-----------( 233      ( 11 Nov 2020 07:15 )             35.3     11-11    139  |  108  |  8   ----------------------------<  80  4.1   |  22  |  0.65    Ca    8.8      11 Nov 2020 07:14    TPro  6.9  /  Alb  3.7  /  TBili  1.4<H>  /  DBili  x   /  AST  670<H>  /  ALT  832<H>  /  AlkPhos  166<H>  11-11    LIVER FUNCTIONS - ( 11 Nov 2020 07:14 )  Alb: 3.7 g/dL / Pro: 6.9 g/dL / ALK PHOS: 166 U/L / ALT: 832 U/L / AST: 670 U/L / GGT: x               Diagnostic Studies: see sunrise for full report  RUQ U/S  FINDINGS:    Liver: Within normal limits.  Bile ducts: Normal caliber. Common bile duct measures 5 mm.  Gallbladder: Cholelithiasis. No gallbladder wall thickening or biliary sludge. Negative sonographic Gabriel sign.  Pancreas: Visualized portions are within normal limits.  Right kidney: 10.7 cm. No hydronephrosis.  Ascites: None.  IVC: Visualized portions are within normal limits.    IMPRESSION:    No biliary ductal dilation. Cholelithiasis with negative sonographic Gabriel sign and no gallbladder wall thickening.

## 2020-11-11 NOTE — CONSULT NOTE ADULT - SUBJECTIVE AND OBJECTIVE BOX
General Surgery Consult  Consulting surgical team: General Surgery (Green)  Consulting attending: Kye Hale    HPI:  40 year old female with PMHx of H. pylori gastritis, fibroids s/p hysterectomy 2017, and recently diagnosed gallstones, who now presents with right upper quadrant abdominal pain x1 day. Patient was seen in the ED on 10/31 for similar pain, where she was diagnosed with gallstones on ultrasound and sent home with oxycodone with scheduled elective cholecystectomy. She had been at her baseline state of health until last night, when she began experiencing right upper quadrant pain 3 hours after eating a sandwich/pistacchios. Describes the pain as 10/10, in the right upper quadrant radiating to the back with some radiation up to the right shoulder and right arm. Associated with nausea, no vomiting. Took oxycodone around 1 am which put her to sleep but did not improve the pain. Patient has been having this pain for a few weeks now but its is increasing in duration. She is unable to identify a direct trigger. Has history of chronic diarrhea with concern for IBS. Denies fevers, chills, diarrhea, constipation, chest pain, shortness of breath, dysuria or hematuria. 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 Denies smoking, drinking, or drugs.    ED Vitals: T97.9, HR 71, /84, RR 20, SpO2 100  In the ED: s/p morphine 2mg x1, morphine 4mg IV x1, zofran 4mg IVx 1, 1L NS (10 Nov 2020 15:08)    Patient was admitted for work up of transaminitis. Originally had appointment with Dr. Hale on 11/18. This morning found to have bilirubinemia to 1.4. Underwent MRCP which was negative for choledocholithiasis. Patient still reporting RUQ pain and nausea.      PAST MEDICAL HISTORY:  H pylori ulcer    Lumbar disc herniation    Patient is Jehovah&#x27;s Witness    Submucous leiomyoma of uterus    Lipoma of back    Encounter for Essure implantation    H/O bilateral breast reduction surgery    No pertinent past medical history        PAST SURGICAL HISTORY:  H/O myomectomy    Lipoma of back    H/O bilateral breast reduction surgery    Encounter for Essure implantation    No significant past surgical history        MEDICATIONS:  famotidine    Tablet 20 milliGRAM(s) Oral daily  morphine  - Injectable 2 milliGRAM(s) IV Push every 6 hours PRN  ondansetron Injectable 4 milliGRAM(s) IV Push every 8 hours PRN  pantoprazole    Tablet 40 milliGRAM(s) Oral before breakfast  polyethylene glycol 3350 17 Gram(s) Oral daily  senna 2 Tablet(s) Oral at bedtime      ALLERGIES:  No Known Allergies      VITALS & I/Os:  Vital Signs Last 24 Hrs  T(C): 36.7 (11 Nov 2020 05:15), Max: 36.8 (10 Nov 2020 16:22)  T(F): 98.1 (11 Nov 2020 05:15), Max: 98.2 (10 Nov 2020 16:22)  HR: 80 (11 Nov 2020 05:15) (64 - 80)  BP: 117/74 (11 Nov 2020 05:15) (109/73 - 117/74)  BP(mean): --  RR: 18 (11 Nov 2020 05:15) (18 - 20)  SpO2: 99% (11 Nov 2020 05:15) (98% - 100%)    I&O's Summary      PHYSICAL EXAM:  General: No acute distress  Respiratory: Nonlabored  Cardiovascular: RRR  Abdominal: Soft, nondistended, RUQ tenderness. No rebound or guarding. No organomegaly, no palpable mass.  Extremities: Warm    LABS:                        11.7   3.79  )-----------( 233      ( 11 Nov 2020 07:15 )             35.3     11-11    139  |  108  |  8   ----------------------------<  80  4.1   |  22  |  0.65    Ca    8.8      11 Nov 2020 07:14    TPro  6.9  /  Alb  3.7  /  TBili  1.4<H>  /  DBili  x   /  AST  670<H>  /  ALT  832<H>  /  AlkPhos  166<H>  11-11    Lactate:    PT/INR - ( 10 Nov 2020 12:28 )   PT: 11.0 sec;   INR: 0.91 ratio         PTT - ( 10 Nov 2020 12:28 )  PTT:27.1 sec      IMAGING:  < from: MR MRCP w/wo IV Cont (11.11.20 @ 14:35) >  EXAM:  MR MRCP WAW IC                            PROCEDURE DATE:  11/11/2020            INTERPRETATION:  CLINICAL INFORMATION: Acute transaminitis, elevated bilirubin and gallstones. Evaluate for choledocholithiasis.    COMPARISON: Abdominal ultrasound 11/10/2020. CT abdomen pelvis 7/26/2018.    PROCEDURE:  MRI of the abdomen was performed with and without intravenous contrast.  IV Contrast: Gadavist. 10 cc administered, 0 cc discarded.  MRCP was performed.    FINDINGS:  LOWER CHEST: Within normal limits.    LIVER: Within normal limits.  BILE DUCTS: No intrahepatic biliary ductal dilatation. Common bile duct measures 5 mm with smooth distal tapering. No choledocholithiasis.  GALLBLADDER: Cholelithiasis. No significant pericholecystic inflammation is seen.  SPLEEN: Within normal limits.  PANCREAS: Within normal limits.  ADRENALS: Within normal limits.  KIDNEYS/URETERS: Within normal limits.    VISUALIZED PORTIONS:  BOWEL: No bowel obstruction.  PERITONEUM: No ascites.  VESSELS: Within normal limits. Patent hepatic and portal veins.  RETROPERITONEUM/LYMPH NODES: No lymphadenopathy.  ABDOMINAL WALL: Within normal limits.  BONES: Within normal limits.    IMPRESSION:    No biliary ductal dilatation or evidence of choledocholithiasis.    Cholelithiasis.      VANESSA LOUISE MD; Fellow Radiology  This document has been electronically signed.  OSEAS HALL MD; Attending Radiologist  This document has been electronically signed. Nov 11 2020  3:42PM    < end of copied text >

## 2020-11-11 NOTE — PROGRESS NOTE ADULT - PROBLEM SELECTOR PLAN 1
- Presented with recurrent abdominal pain possibly 2/2 to hepatitis vs biliary colic  - being evaluated by general surgery outpt for elective anna  - associated transaminitis w/ hyperbilirubinemia  - RUQ ultrasound show cholelithiasis w/o cholecystitis  - symptomatic pain management  - hepatitis panel and ebv and cmv neg  - hepatology recs mrcp  - consult surg

## 2020-11-12 LAB
ALBUMIN SERPL ELPH-MCNC: 3.9 G/DL — SIGNIFICANT CHANGE UP (ref 3.3–5)
ALP SERPL-CCNC: 211 U/L — HIGH (ref 40–120)
ALT FLD-CCNC: 623 U/L — HIGH (ref 10–45)
ANION GAP SERPL CALC-SCNC: 10 MMOL/L — SIGNIFICANT CHANGE UP (ref 5–17)
APTT BLD: 30.3 SEC — SIGNIFICANT CHANGE UP (ref 27.5–35.5)
AST SERPL-CCNC: 300 U/L — HIGH (ref 10–40)
BILIRUB SERPL-MCNC: 1.5 MG/DL — HIGH (ref 0.2–1.2)
BLD GP AB SCN SERPL QL: NEGATIVE — SIGNIFICANT CHANGE UP
BUN SERPL-MCNC: 9 MG/DL — SIGNIFICANT CHANGE UP (ref 7–23)
CALCIUM SERPL-MCNC: 9 MG/DL — SIGNIFICANT CHANGE UP (ref 8.4–10.5)
CHLORIDE SERPL-SCNC: 107 MMOL/L — SIGNIFICANT CHANGE UP (ref 96–108)
CO2 SERPL-SCNC: 22 MMOL/L — SIGNIFICANT CHANGE UP (ref 22–31)
CREAT SERPL-MCNC: 0.68 MG/DL — SIGNIFICANT CHANGE UP (ref 0.5–1.3)
GLUCOSE SERPL-MCNC: 91 MG/DL — SIGNIFICANT CHANGE UP (ref 70–99)
HCT VFR BLD CALC: 36 % — SIGNIFICANT CHANGE UP (ref 34.5–45)
HGB BLD-MCNC: 12.1 G/DL — SIGNIFICANT CHANGE UP (ref 11.5–15.5)
INR BLD: 0.97 RATIO — SIGNIFICANT CHANGE UP (ref 0.88–1.16)
MAGNESIUM SERPL-MCNC: 2.2 MG/DL — SIGNIFICANT CHANGE UP (ref 1.6–2.6)
MCHC RBC-ENTMCNC: 28.7 PG — SIGNIFICANT CHANGE UP (ref 27–34)
MCHC RBC-ENTMCNC: 33.6 GM/DL — SIGNIFICANT CHANGE UP (ref 32–36)
MCV RBC AUTO: 85.3 FL — SIGNIFICANT CHANGE UP (ref 80–100)
NRBC # BLD: 0 /100 WBCS — SIGNIFICANT CHANGE UP (ref 0–0)
PHOSPHATE SERPL-MCNC: 3 MG/DL — SIGNIFICANT CHANGE UP (ref 2.5–4.5)
PLATELET # BLD AUTO: 239 K/UL — SIGNIFICANT CHANGE UP (ref 150–400)
POTASSIUM SERPL-MCNC: 3.8 MMOL/L — SIGNIFICANT CHANGE UP (ref 3.5–5.3)
POTASSIUM SERPL-SCNC: 3.8 MMOL/L — SIGNIFICANT CHANGE UP (ref 3.5–5.3)
PROT SERPL-MCNC: 6.9 G/DL — SIGNIFICANT CHANGE UP (ref 6–8.3)
PROTHROM AB SERPL-ACNC: 11.7 SEC — SIGNIFICANT CHANGE UP (ref 10.6–13.6)
RBC # BLD: 4.22 M/UL — SIGNIFICANT CHANGE UP (ref 3.8–5.2)
RBC # FLD: 13.4 % — SIGNIFICANT CHANGE UP (ref 10.3–14.5)
RH IG SCN BLD-IMP: POSITIVE — SIGNIFICANT CHANGE UP
SARS-COV-2 RNA SPEC QL NAA+PROBE: SIGNIFICANT CHANGE UP
SODIUM SERPL-SCNC: 139 MMOL/L — SIGNIFICANT CHANGE UP (ref 135–145)
VZV IGM SER-ACNC: <0.91 INDEX — SIGNIFICANT CHANGE UP (ref 0–0.9)
WBC # BLD: 5.09 K/UL — SIGNIFICANT CHANGE UP (ref 3.8–10.5)
WBC # FLD AUTO: 5.09 K/UL — SIGNIFICANT CHANGE UP (ref 3.8–10.5)

## 2020-11-12 PROCEDURE — 99233 SBSQ HOSP IP/OBS HIGH 50: CPT | Mod: GC

## 2020-11-12 PROCEDURE — 43262 ENDO CHOLANGIOPANCREATOGRAPH: CPT | Mod: GC

## 2020-11-12 PROCEDURE — 74328 X-RAY BILE DUCT ENDOSCOPY: CPT | Mod: 26,GC

## 2020-11-12 PROCEDURE — 43264 ERCP REMOVE DUCT CALCULI: CPT | Mod: GC

## 2020-11-12 PROCEDURE — 43259 EGD US EXAM DUODENUM/JEJUNUM: CPT | Mod: GC

## 2020-11-12 RX ORDER — HYDROMORPHONE HYDROCHLORIDE 2 MG/ML
1 INJECTION INTRAMUSCULAR; INTRAVENOUS; SUBCUTANEOUS EVERY 4 HOURS
Refills: 0 | Status: DISCONTINUED | OUTPATIENT
Start: 2020-11-12 | End: 2020-11-14

## 2020-11-12 RX ORDER — POTASSIUM CHLORIDE 20 MEQ
40 PACKET (EA) ORAL ONCE
Refills: 0 | Status: DISCONTINUED | OUTPATIENT
Start: 2020-11-12 | End: 2020-11-12

## 2020-11-12 RX ORDER — MORPHINE SULFATE 50 MG/1
2 CAPSULE, EXTENDED RELEASE ORAL ONCE
Refills: 0 | Status: DISCONTINUED | OUTPATIENT
Start: 2020-11-12 | End: 2020-11-12

## 2020-11-12 RX ORDER — INDOMETHACIN 50 MG
100 CAPSULE ORAL ONCE
Refills: 0 | Status: DISCONTINUED | OUTPATIENT
Start: 2020-11-12 | End: 2020-11-14

## 2020-11-12 RX ORDER — SODIUM CHLORIDE 9 MG/ML
1000 INJECTION, SOLUTION INTRAVENOUS
Refills: 0 | Status: DISCONTINUED | OUTPATIENT
Start: 2020-11-12 | End: 2020-11-14

## 2020-11-12 RX ADMIN — ONDANSETRON 4 MILLIGRAM(S): 8 TABLET, FILM COATED ORAL at 07:44

## 2020-11-12 RX ADMIN — MORPHINE SULFATE 2 MILLIGRAM(S): 50 CAPSULE, EXTENDED RELEASE ORAL at 08:27

## 2020-11-12 RX ADMIN — Medication 100 GRAM(S): at 05:00

## 2020-11-12 RX ADMIN — POLYETHYLENE GLYCOL 3350 17 GRAM(S): 17 POWDER, FOR SOLUTION ORAL at 22:11

## 2020-11-12 RX ADMIN — HYDROMORPHONE HYDROCHLORIDE 1 MILLIGRAM(S): 2 INJECTION INTRAMUSCULAR; INTRAVENOUS; SUBCUTANEOUS at 12:02

## 2020-11-12 RX ADMIN — SODIUM CHLORIDE 30 MILLILITER(S): 9 INJECTION, SOLUTION INTRAVENOUS at 12:06

## 2020-11-12 RX ADMIN — MORPHINE SULFATE 2 MILLIGRAM(S): 50 CAPSULE, EXTENDED RELEASE ORAL at 05:30

## 2020-11-12 RX ADMIN — MORPHINE SULFATE 2 MILLIGRAM(S): 50 CAPSULE, EXTENDED RELEASE ORAL at 07:33

## 2020-11-12 RX ADMIN — SENNA PLUS 2 TABLET(S): 8.6 TABLET ORAL at 22:12

## 2020-11-12 RX ADMIN — MORPHINE SULFATE 2 MILLIGRAM(S): 50 CAPSULE, EXTENDED RELEASE ORAL at 04:52

## 2020-11-12 RX ADMIN — Medication 100 GRAM(S): at 22:16

## 2020-11-12 RX ADMIN — SODIUM CHLORIDE 30 MILLILITER(S): 9 INJECTION, SOLUTION INTRAVENOUS at 22:17

## 2020-11-12 NOTE — PROGRESS NOTE ADULT - ATTENDING COMMENTS
Agree with resident documentation above except as noted:    Pt continues to report severe abdominal pain not appropriately managed by current regimen. Denies associated nausea and vomiting.  On exam, RUQ severely TTP. Abd soft, non distended, no rebound or guarding.  MRCP reviewed: Cholelithiasis without choledocholithiasis or CBD dilation  NPO for EUS/ERCP today.  Started on antibiotics per Surgery recs  Obtain blood cultures  LFTs stable with rising alk phos; trend daily  Switch to dilaudid 1mg Q4H PRN  Bowel regimen

## 2020-11-12 NOTE — PROGRESS NOTE ADULT - SUBJECTIVE AND OBJECTIVE BOX
GENERAL SURGERY PROGRESS NOTE    40yFemale    SUBJECTIVE:  Patient seen and examined at bedside. No acute events overnight. C/o RUQ pain and nausea. Denies fevers, chills, vomiting, chest pain, and shortness of breath.     --------------------------------------------------------------------------------------------------  OBJECTIVE:     Vital Signs:  Vital Signs Last 24 Hrs  T(C): 36.9 (12 Nov 2020 04:52), Max: 36.9 (12 Nov 2020 04:52)  T(F): 98.4 (12 Nov 2020 04:52), Max: 98.4 (12 Nov 2020 04:52)  HR: 76 (12 Nov 2020 04:52) (76 - 76)  BP: 122/79 (12 Nov 2020 04:52) (114/82 - 122/79)  BP(mean): --  RR: 18 (12 Nov 2020 04:52) (18 - 18)  SpO2: 99% (12 Nov 2020 04:52) (99% - 99%)    --------------------------------------------------------------------------------------------------  Inputs/Outputs:    --------------------------------------------------------------------------------------------------  Laboratories:                        11.7   3.79  )-----------( 233      ( 11 Nov 2020 07:15 )             35.3     LIVER FUNCTIONS - ( 11 Nov 2020 07:14 )  Alb: 3.7 g/dL / Pro: 6.9 g/dL / ALK PHOS: 166 U/L / ALT: 832 U/L / AST: 670 U/L / GGT: x             11 Nov 2020 07:14    139    |  108    |  8      ----------------------------<  80     4.1     |  22     |  0.65     Ca    8.8        11 Nov 2020 07:14    TPro  6.9    /  Alb  3.7    /  TBili  1.4    /  DBili  x      /  AST  670    /  ALT  832    /  AlkPhos  166    11 Nov 2020 07:14    PT/INR - ( 10 Nov 2020 12:28 )   PT: 11.0 sec;   INR: 0.91 ratio         PTT - ( 10 Nov 2020 12:28 )  PTT:27.1 sec    --------------------------------------------------------------------------------------------------  Physical Exam:  General: AAOx3, NAD, resting comfortably   HEENT: NC/AT  Respiratory: no increased work of breathing   Abdomen: soft, nondistended, RUQ tenderness. No rebound or guarding. No organomegaly, no palpable mass.  Extremities: warm and well perfused  --------------------------------------------------------------------------------------------------  Medications:  MEDICATIONS  (STANDING):  cefoTEtan  IVPB 2 Gram(s) IV Intermittent every 12 hours  famotidine    Tablet 20 milliGRAM(s) Oral daily  pantoprazole    Tablet 40 milliGRAM(s) Oral before breakfast  polyethylene glycol 3350 17 Gram(s) Oral daily  senna 2 Tablet(s) Oral at bedtime    MEDICATIONS  (PRN):  morphine  - Injectable 2 milliGRAM(s) IV Push every 6 hours PRN moderate to severe pain  ondansetron Injectable 4 milliGRAM(s) IV Push every 8 hours PRN Nausea and/or Vomiting

## 2020-11-12 NOTE — PROGRESS NOTE ADULT - PROBLEM SELECTOR PLAN 2
- AST and ALT elevated to 719/360 from 24/12 on 10/31  - AST//623 w/ 211 alk phos and 1.5 of bili  - likely 2/2 to gallstones  - MRCP shows gallstones w/o any choledocholithiasis

## 2020-11-12 NOTE — CHART NOTE - NSCHARTNOTEFT_GEN_A_CORE
Pt still w/ pain, worse today. TBili 1.5. MRCP shows normal CBD no CBD stones, but on our read shows stones in cystic duct that could be intermittently spilling over into CBD and causing pain.     Plan for EUS +/- ERCP today (this afternoon)  Please keep NPO  F/u surgery recs re: cholecystectomy post procedure    Giovanni Enriquez  Gastroenterology Fellow  NON-URGENT CONSULTS:  Please email [email or web address]  AT NIGHT AND ON WEEKENDS:  Contact on-call GI fellow via answering service ([phone]) from 5pm-8am and on weekends/holidays  MONDAY-FRIDAY 8AM-5PM:  Available via [employer] Teams  Pager# [phone] ([facility]) or [phone] ([facility])  GI Phone# [phone] ([facility])

## 2020-11-12 NOTE — PROGRESS NOTE ADULT - SUBJECTIVE AND OBJECTIVE BOX
Gomez Zamora  PGY1/Medicine/[id number]/[phone]    ROSENDO FORRESTER  40y  Female      Patient is a 40y old  Female who presents with a chief complaint of RUQ pain w/ transaminitis (12 Nov 2020 05:23)      INTERVAL HPI/OVERNIGHT EVENTS/ROS: Pt had multiple episodes of severe pain overnight requiring morphine. Denies any cp/f/c/sob/dysuria. Last BM 3 days ago.     Vital Signs Last 24 Hrs  T(C): 36.9 (12 Nov 2020 04:52), Max: 36.9 (12 Nov 2020 04:52)  T(F): 98.4 (12 Nov 2020 04:52), Max: 98.4 (12 Nov 2020 04:52)  HR: 76 (12 Nov 2020 04:52) (76 - 76)  BP: 122/79 (12 Nov 2020 04:52) (114/82 - 122/79)  BP(mean): --  RR: 18 (12 Nov 2020 04:52) (18 - 18)  SpO2: 99% (12 Nov 2020 04:52) (99% - 99%)    PHYSICAL EXAM:  GENERAL: NAD, lying in bed comfortably  HEAD:  Atraumatic, Normocephalic  EYES: EOMI, PERRLA, conjunctiva and sclera clear  ENT: Moist mucous membranes  NECK: Supple, No JVD  CHEST/LUNG: Clear to auscultation bilaterally; No rales, rhonchi, wheezing, or rubs. Unlabored respirations  HEART: Regular rate and rhythm; No murmurs, rubs, or gallops  ABDOMEN: Bowel sounds present; RUQ TTP; campbell's negative  EXTREMITIES:  2+ Peripheral Pulses, brisk capillary refill. No clubbing, cyanosis, or edema  NERVOUS SYSTEM:  Alert & Oriented X3, speech clear. No deficits   MSK: FROM all 4 extremities, full and equal strength    Consultant(s) Notes Reviewed:  [x ] YES  [ ] NO  Care Discussed with Consultants/Other Providers [ x] YES  [ ] NO    LABS:                        12.1   5.09  )-----------( 239      ( 12 Nov 2020 07:01 )             36.0     11-12    139  |  107  |  9   ----------------------------<  91  3.8   |  22  |  0.68    Ca    9.0      12 Nov 2020 06:55  Phos  3.0     11-12  Mg     2.2     11-12    TPro  6.9  /  Alb  3.9  /  TBili  1.5<H>  /  DBili  x   /  AST  300<H>  /  ALT  623<H>  /  AlkPhos  211<H>  11-12    PT/INR - ( 12 Nov 2020 07:01 )   PT: 11.7 sec;   INR: 0.97 ratio         PTT - ( 12 Nov 2020 07:01 )  PTT:30.3 sec      CAPILLARY BLOOD GLUCOSE          RADIOLOGY & ADDITIONAL TESTS:    Imaging Personally Reviewed:  [ ] YES  [ ] NO

## 2020-11-12 NOTE — PROGRESS NOTE ADULT - ATTENDING COMMENTS
Patient seen/examined.  Agree w above note and plan and have discussed plan w house staff.  Despite negative MRCP, bili and alk phos on the rise.  Needs CBD definitively cleared prior to cholecystectomy.      Kaiser Molina MD Patient seen/examined.  Agree w above note and plan and have discussed plan w house staff.  Despite negative MRCP, bili and alk phos on the rise.  Needs CBD definitively cleared prior to cholecystectomy.  EUS?    Kaiser Molina MD

## 2020-11-12 NOTE — PRE PROCEDURE NOTE - PRE PROCEDURE EVALUATION
Pre-Endoscopy Evaluation    Attending Physician: KATHY Matute    Procedure: EUS + ERCP    Indication for Procedure: gallstones, abd pain, elevated liver enzymes    Pertinent History: See chart    PAST MEDICAL & SURGICAL HISTORY:  H pylori ulcer    Lumbar disc herniation  physical therapy    Patient is Jehovah&#x27;s Witness  not completely baptized, will accept blood products at this time, patient doesn&#x27;t have health care proxy    Submucous leiomyoma of uterus    H/O myomectomy  hysteroscopic, 4/2017    Lipoma of back  s/p excision    H/O bilateral breast reduction surgery    Encounter for Essure implantation  s/p essure placement, over 12 years ago        Allergies    No Known Allergies    Intolerances        Medications: MEDICATIONS  (STANDING):  cefoTEtan  IVPB 2 Gram(s) IV Intermittent every 12 hours  famotidine    Tablet 20 milliGRAM(s) Oral daily  HYDROmorphone  Injectable 1 milliGRAM(s) IV Push every 4 hours  indomethacin Suppository 100 milliGRAM(s) Rectal once  lactated ringers. 1000 milliLiter(s) (30 mL/Hr) IV Continuous <Continuous>  pantoprazole    Tablet 40 milliGRAM(s) Oral before breakfast  polyethylene glycol 3350 17 Gram(s) Oral daily  senna 2 Tablet(s) Oral at bedtime    MEDICATIONS  (PRN):  ondansetron Injectable 4 milliGRAM(s) IV Push every 8 hours PRN Nausea and/or Vomiting      Pertinent lab data:                        12.1   5.09  )-----------( 239      ( 12 Nov 2020 07:01 )             36.0     11-12    139  |  107  |  9   ----------------------------<  91  3.8   |  22  |  0.68    Ca    9.0      12 Nov 2020 06:55  Phos  3.0     11-12  Mg     2.2     11-12    TPro  6.9  /  Alb  3.9  /  TBili  1.5<H>  /  DBili  x   /  AST  300<H>  /  ALT  623<H>  /  AlkPhos  211<H>  11-12    PT/INR - ( 12 Nov 2020 07:01 )   PT: 11.7 sec;   INR: 0.97 ratio         PTT - ( 12 Nov 2020 07:01 )  PTT:30.3 sec            Physical Examination:  Daily     Daily   Vital Signs Last 24 Hrs  T(C): 36.7 (12 Nov 2020 13:22), Max: 36.9 (12 Nov 2020 04:52)  T(F): 98 (12 Nov 2020 13:22), Max: 98.4 (12 Nov 2020 04:52)  HR: 64 (12 Nov 2020 13:22) (64 - 76)  BP: 112/80 (12 Nov 2020 13:22) (112/80 - 122/79)  BP(mean): --  RR: 18 (12 Nov 2020 13:22) (18 - 18)  SpO2: 98% (12 Nov 2020 13:22) (98% - 99%)  Constitutional: NAD  HEENT: PERRLA, EOMI,    Neck:  No JVD  Respiratory: CTAB/L  Cardiovascular: S1 and S2  Gastrointestinal: BS+, soft, NT/ND  Extremities: No peripheral edema  Neurological: A/O x 3, no focal deficits  Psychiatric: Normal mood, normal affect  : No Winn  Skin: No rashes    Comments:    ASA Class: I []  II [x]  III []  IV []    The patient is a suitable candidate for the planned procedure unless box checked [ ]  No, explain:

## 2020-11-12 NOTE — PROGRESS NOTE ADULT - ASSESSMENT
Assessment:  40 year old female with PMHx of H. pylori gastritis, fibroids s/p hysterectomy 2017, recently diagnosed biliary colic, presenting with abdominal pain, transaminitis and bilirubinemia concerning for choledocholithiasis, MRCP negative for CBD obstruction; patient possibly passed stone    Plan:  - Recommend empiric antibiotics (Cefotetan)  - F/u bilirubin and hepatic function panel this AM  - Appreciate GI input  - May benefit from inpatient cholecystectomy prior to discharge, pending clinical course  Will continue to follow    Please contact [facility] (p. [phone]) with any questions.     Surgery, [facility]   Pager [phone]  [facility]

## 2020-11-12 NOTE — PROGRESS NOTE ADULT - PROBLEM SELECTOR PLAN 1
- Presented with recurrent abdominal pain possibly 2/2 to hepatitis vs biliary colic  - being evaluated by general surgery outpt for elective anna  - associated transaminitis w/ hyperbilirubinemia  - RUQ ultrasound show cholelithiasis w/o cholecystitis  - symptomatic pain management  - hepatitis panel and ebv and cmv neg  - MRCP is positive only for gallstones / no evidence of choledocho  - GI will perform EUS+/-ERCP today  -surg following --> considering inpatient cholecystectomy

## 2020-11-13 ENCOUNTER — TRANSCRIPTION ENCOUNTER (OUTPATIENT)
Age: 40
End: 2020-11-13

## 2020-11-13 LAB
ALBUMIN SERPL ELPH-MCNC: 3.6 G/DL — SIGNIFICANT CHANGE UP (ref 3.3–5)
ALP SERPL-CCNC: 216 U/L — HIGH (ref 40–120)
ALT FLD-CCNC: 527 U/L — HIGH (ref 10–45)
ANION GAP SERPL CALC-SCNC: 11 MMOL/L — SIGNIFICANT CHANGE UP (ref 5–17)
APTT BLD: 31.9 SEC — SIGNIFICANT CHANGE UP (ref 27.5–35.5)
AST SERPL-CCNC: 161 U/L — HIGH (ref 10–40)
BILIRUB DIRECT SERPL-MCNC: 0.4 MG/DL — HIGH (ref 0–0.2)
BILIRUB INDIRECT FLD-MCNC: 0.5 MG/DL — SIGNIFICANT CHANGE UP (ref 0.2–1)
BILIRUB SERPL-MCNC: 0.9 MG/DL — SIGNIFICANT CHANGE UP (ref 0.2–1.2)
BUN SERPL-MCNC: 6 MG/DL — LOW (ref 7–23)
CALCIUM SERPL-MCNC: 9.4 MG/DL — SIGNIFICANT CHANGE UP (ref 8.4–10.5)
CHLORIDE SERPL-SCNC: 106 MMOL/L — SIGNIFICANT CHANGE UP (ref 96–108)
CO2 SERPL-SCNC: 22 MMOL/L — SIGNIFICANT CHANGE UP (ref 22–31)
CREAT SERPL-MCNC: 0.6 MG/DL — SIGNIFICANT CHANGE UP (ref 0.5–1.3)
GLUCOSE SERPL-MCNC: 89 MG/DL — SIGNIFICANT CHANGE UP (ref 70–99)
HCG SERPL-ACNC: <2 MIU/ML — SIGNIFICANT CHANGE UP
HCT VFR BLD CALC: 36.9 % — SIGNIFICANT CHANGE UP (ref 34.5–45)
HGB BLD-MCNC: 12.4 G/DL — SIGNIFICANT CHANGE UP (ref 11.5–15.5)
HSV1 AB FLD QL: SIGNIFICANT CHANGE UP TITER
HSV2 AB FLD-ACNC: SIGNIFICANT CHANGE UP TITER
INR BLD: 1.02 RATIO — SIGNIFICANT CHANGE UP (ref 0.88–1.16)
MAGNESIUM SERPL-MCNC: 2.1 MG/DL — SIGNIFICANT CHANGE UP (ref 1.6–2.6)
MCHC RBC-ENTMCNC: 28.6 PG — SIGNIFICANT CHANGE UP (ref 27–34)
MCHC RBC-ENTMCNC: 33.6 GM/DL — SIGNIFICANT CHANGE UP (ref 32–36)
MCV RBC AUTO: 85.2 FL — SIGNIFICANT CHANGE UP (ref 80–100)
NRBC # BLD: 0 /100 WBCS — SIGNIFICANT CHANGE UP (ref 0–0)
PHOSPHATE SERPL-MCNC: 2.9 MG/DL — SIGNIFICANT CHANGE UP (ref 2.5–4.5)
PLATELET # BLD AUTO: 244 K/UL — SIGNIFICANT CHANGE UP (ref 150–400)
POTASSIUM SERPL-MCNC: 4.1 MMOL/L — SIGNIFICANT CHANGE UP (ref 3.5–5.3)
POTASSIUM SERPL-SCNC: 4.1 MMOL/L — SIGNIFICANT CHANGE UP (ref 3.5–5.3)
PROT SERPL-MCNC: 6.8 G/DL — SIGNIFICANT CHANGE UP (ref 6–8.3)
PROTHROM AB SERPL-ACNC: 12.2 SEC — SIGNIFICANT CHANGE UP (ref 10.6–13.6)
RBC # BLD: 4.33 M/UL — SIGNIFICANT CHANGE UP (ref 3.8–5.2)
RBC # FLD: 13.3 % — SIGNIFICANT CHANGE UP (ref 10.3–14.5)
SARS-COV-2 IGG SERPL QL IA: POSITIVE
SARS-COV-2 IGM SERPL IA-ACNC: 1.71 INDEX — HIGH
SODIUM SERPL-SCNC: 139 MMOL/L — SIGNIFICANT CHANGE UP (ref 135–145)
WBC # BLD: 8.22 K/UL — SIGNIFICANT CHANGE UP (ref 3.8–10.5)
WBC # FLD AUTO: 8.22 K/UL — SIGNIFICANT CHANGE UP (ref 3.8–10.5)

## 2020-11-13 PROCEDURE — 99232 SBSQ HOSP IP/OBS MODERATE 35: CPT | Mod: GC

## 2020-11-13 PROCEDURE — 99233 SBSQ HOSP IP/OBS HIGH 50: CPT | Mod: GC

## 2020-11-13 RX ADMIN — Medication 100 GRAM(S): at 12:48

## 2020-11-13 RX ADMIN — ONDANSETRON 4 MILLIGRAM(S): 8 TABLET, FILM COATED ORAL at 20:36

## 2020-11-13 RX ADMIN — PANTOPRAZOLE SODIUM 40 MILLIGRAM(S): 20 TABLET, DELAYED RELEASE ORAL at 06:12

## 2020-11-13 RX ADMIN — Medication 1 TABLET(S): at 14:39

## 2020-11-13 RX ADMIN — Medication 1 TABLET(S): at 11:44

## 2020-11-13 RX ADMIN — Medication 100 GRAM(S): at 21:29

## 2020-11-13 RX ADMIN — FAMOTIDINE 20 MILLIGRAM(S): 10 INJECTION INTRAVENOUS at 11:43

## 2020-11-13 RX ADMIN — HYDROMORPHONE HYDROCHLORIDE 1 MILLIGRAM(S): 2 INJECTION INTRAMUSCULAR; INTRAVENOUS; SUBCUTANEOUS at 21:00

## 2020-11-13 RX ADMIN — HYDROMORPHONE HYDROCHLORIDE 1 MILLIGRAM(S): 2 INJECTION INTRAMUSCULAR; INTRAVENOUS; SUBCUTANEOUS at 20:35

## 2020-11-13 NOTE — PROGRESS NOTE ADULT - SUBJECTIVE AND OBJECTIVE BOX
GENERAL SURGERY PROGRESS NOTE    INTERVAL EVENTS: S/p ERCP yesterday with distal CBD stone removal by biliary sphincterotomy and balloon extraction    SUBJECTIVE:  Patient seen and examined at bedside. No acute events overnight. C/o RUQ abdominal pain, similar to prior. Denies fevers, chills, nausea, vomiting, chest pain, and shortness of breath.     --------------------------------------------------------------------------------------------------  OBJECTIVE:     Vital Signs:  Vital Signs Last 24 Hrs  T(C): 36.6 (12 Nov 2020 20:38), Max: 36.7 (12 Nov 2020 13:22)  T(F): 97.9 (12 Nov 2020 20:38), Max: 98 (12 Nov 2020 13:22)  HR: 86 (12 Nov 2020 20:38) (64 - 87)  BP: 115/76 (12 Nov 2020 20:38) (112/80 - 131/75)  BP(mean): --  RR: 18 (12 Nov 2020 20:38) (15 - 19)  SpO2: 98% (12 Nov 2020 20:38) (95% - 100%)    --------------------------------------------------------------------------------------------------  Inputs/Outputs:    12 Nov 2020 07:01  -  13 Nov 2020 04:52  --------------------------------------------------------  IN:    Lactated Ringers: 30 mL  Total IN: 30 mL    OUT:    Oral Fluid: 0 mL  Total OUT: 0 mL    Total NET: 30 mL    --------------------------------------------------------------------------------------------------  Laboratories:                        12.1   5.09  )-----------( 239      ( 12 Nov 2020 07:01 )             36.0     LIVER FUNCTIONS - ( 12 Nov 2020 06:55 )  Alb: 3.9 g/dL / Pro: 6.9 g/dL / ALK PHOS: 211 U/L / ALT: 623 U/L / AST: 300 U/L / GGT: x             12 Nov 2020 06:55    139    |  107    |  9      ----------------------------<  91     3.8     |  22     |  0.68     Ca    9.0        12 Nov 2020 06:55  Phos  3.0       12 Nov 2020 06:55  Mg     2.2       12 Nov 2020 06:55    TPro  6.9    /  Alb  3.9    /  TBili  1.5    /  DBili  x      /  AST  300    /  ALT  623    /  AlkPhos  211    12 Nov 2020 06:55    PT/INR - ( 12 Nov 2020 07:01 )   PT: 11.7 sec;   INR: 0.97 ratio         PTT - ( 12 Nov 2020 07:01 )  PTT:30.3 sec    --------------------------------------------------------------------------------------------------  Physical Exam:  General: AAOx3, NAD, resting comfortably   HEENT: NC/AT  Respiratory: no increased work of breathing   Abdomen: soft, tender to palpation in RUQ, nondistended, no rebound or guarding   Extremities: warm and well perfused  --------------------------------------------------------------------------------------------------  Medications:  MEDICATIONS  (STANDING):  cefoTEtan  IVPB 2 Gram(s) IV Intermittent every 12 hours  famotidine    Tablet 20 milliGRAM(s) Oral daily  HYDROmorphone  Injectable 1 milliGRAM(s) IV Push every 4 hours  indomethacin Suppository 100 milliGRAM(s) Rectal once  lactated ringers. 1000 milliLiter(s) (30 mL/Hr) IV Continuous <Continuous>  pantoprazole    Tablet 40 milliGRAM(s) Oral before breakfast  polyethylene glycol 3350 17 Gram(s) Oral daily  senna 2 Tablet(s) Oral at bedtime    MEDICATIONS  (PRN):  ondansetron Injectable 4 milliGRAM(s) IV Push every 8 hours PRN Nausea and/or Vomiting

## 2020-11-13 NOTE — PROGRESS NOTE ADULT - ATTENDING COMMENTS
40F w cholelithiasis, RUQ pain and elevated liver tests, now s/p EUS/ERCP with sphincterotomy and small CBD stone removal yesterday.  Feeling better today and improvement in TB.    Surgery to take for cholecystectomy this admission for definitive treatment.  Monitor liver tests and pain levels.    Thank you for this interesting consult.  Please call the advanced GI service with any questions or concerns.

## 2020-11-13 NOTE — PROGRESS NOTE ADULT - ASSESSMENT
40F w/ hx of H pylori gastritis s/p treatment, gallstones, presenting w/ 2 weeks of intermittent epigastric/RUQ pain radiating to her back and shoulder. Found to have gallstones w/o biliary ductal dilatation w/ elevated liver enzymes. Found to have choledocholithiasis s/p sphincterotomy and stone removal 11/12.    Impression:  #Choledocholithiasis - s/p sphincterotomy and clearance of CBD  #Cholelithiasis w/o cholecystitis  #Suspicion for gastroparesis based on EGD findings of large amount of food; pt reporting long-standing hx of early satiety     Recommendations:  - agree w/ surgical plan for CCY this admission  - trend liver enzymes  - pain management per primary team  - NPO today pending surgery, otherwise no GI contraindication to diet  - Pt should f/u with Dr. Daniela Hernandez ([phone]) for management of possible gastroparesis and other chronic GI issues on discharge    Giovanni Enriquez  Gastroenterology Fellow  NON-URGENT CONSULTS:  Please email [email or web address]  AT NIGHT AND ON WEEKENDS:  Contact on-call GI fellow via answering service ([phone]) from 5pm-8am and on weekends/holidays  MONDAY-FRIDAY 8AM-5PM:  Available via [employer] Teams  Pager# [phone] ([facility]) or [phone] ([facility])  GI Phone# [phone] ([facility])

## 2020-11-13 NOTE — PROGRESS NOTE ADULT - SUBJECTIVE AND OBJECTIVE BOX
Chief Complaint:  Patient is a 40y old  Female who presents with a chief complaint of RUQ pain w/ transaminitis (13 Nov 2020 08:39)    Reason for consult: choledocholithiasis    Interval Events: S/p ERCP w/ sphincterotomy and stone removal yesterday. Pt feels well today.     Hospital Medications:  cefoTEtan  IVPB 2 Gram(s) IV Intermittent every 12 hours  famotidine    Tablet 20 milliGRAM(s) Oral daily  HYDROmorphone  Injectable 1 milliGRAM(s) IV Push every 4 hours  indomethacin Suppository 100 milliGRAM(s) Rectal once  lactated ringers. 1000 milliLiter(s) IV Continuous <Continuous>  ondansetron Injectable 4 milliGRAM(s) IV Push every 8 hours PRN  pantoprazole    Tablet 40 milliGRAM(s) Oral before breakfast  polyethylene glycol 3350 17 Gram(s) Oral daily  senna 2 Tablet(s) Oral at bedtime      ROS:   General:  No  fevers, chills, night sweats, fatigue  Eyes:  Good vision, no reported pain  ENT:  No sore throat, pain, runny nose  CV:  No pain, palpitations  Pulm:  No dyspnea, cough  GI:  See HPI, otherwise negative  :  No  incontinence, nocturia  Muscle:  No pain, weakness  Neuro:  No memory problems  Psych:  No insomnia, mood problems, depression  Endocrine:  No polyuria, polydipsia, cold/heat intolerance  Heme:  No petechiae, ecchymosis, easy bruisability  Skin:  No rash    PHYSICAL EXAM:   Vital Signs:  Vital Signs Last 24 Hrs  T(C): 36.8 (13 Nov 2020 05:20), Max: 36.8 (13 Nov 2020 05:20)  T(F): 98.2 (13 Nov 2020 05:20), Max: 98.2 (13 Nov 2020 05:20)  HR: 65 (13 Nov 2020 05:20) (64 - 87)  BP: 115/70 (13 Nov 2020 05:20) (112/80 - 131/75)  BP(mean): --  RR: 18 (13 Nov 2020 05:20) (15 - 19)  SpO2: 98% (13 Nov 2020 05:20) (95% - 100%)  Daily Height in cm: 162.56 (13 Nov 2020 04:43)    Daily     GENERAL: no acute distress  NEURO: alert, no asterixis  HEENT: anicteric sclera, no conjunctival pallor appreciated  CHEST: no respiratory distress, no accessory muscle use  CARDIAC: regular rate, rhythm  ABDOMEN: soft, non-tender, non-distended, no rebound or guarding  EXTREMITIES: warm, well perfused, no edema  SKIN: no lesions noted    LABS: reviewed                        12.4   8.22  )-----------( 244      ( 13 Nov 2020 07:01 )             36.9     11-13    139  |  106  |  6<L>  ----------------------------<  89  4.1   |  22  |  0.60    Ca    9.4      13 Nov 2020 07:01  Phos  2.9     11-13  Mg     2.1     11-13    TPro  6.8  /  Alb  3.6  /  TBili  0.9  /  DBili  0.4<H>  /  AST  161<H>  /  ALT  527<H>  /  AlkPhos  216<H>  11-13    LIVER FUNCTIONS - ( 13 Nov 2020 07:01 )  Alb: 3.6 g/dL / Pro: 6.8 g/dL / ALK PHOS: 216 U/L / ALT: 527 U/L / AST: 161 U/L / GGT: x             Interval Diagnostic Studies: see sunrise for full report

## 2020-11-13 NOTE — PROGRESS NOTE ADULT - PROBLEM SELECTOR PLAN 1
- Presented with recurrent abdominal pain possibly 2/2 to hepatitis vs biliary colic  - being evaluated by general surgery outpt for elective anna  - associated transaminitis w/ hyperbilirubinemia  - RUQ ultrasound show cholelithiasis w/o cholecystitis  - symptomatic pain management  - hepatitis panel and ebv and cmv neg  - MRCP is positive only for gallstones / no evidence of choledocho  - s/p ERCP, had distal CBD stone extracted  - surg following --> considering inpatient cholecystectomy

## 2020-11-13 NOTE — PROGRESS NOTE ADULT - PROBLEM SELECTOR PLAN 2
- AST and ALT elevated to 719/360 from 24/12 on 10/31  - likely 2/2 to gallstones  - MRCP shows gallstones w/o any choledocholithiasis  - ERCP showed distal CBD stone which was extracted  - liver enzymes improving

## 2020-11-13 NOTE — PROGRESS NOTE ADULT - ASSESSMENT
Assessment:  40 year old female with PMHx of H. pylori gastritis, fibroids s/p hysterectomy 2017, recently diagnosed biliary colic, presenting with abdominal pain, transaminitis and bilirubinemia concerning for choledocholithiasis, now s/p ERCP with distal CBD stone removal by biliary sphincterotomy and balloon extraction (11/12)    Plan:  - C/w antibiotics (Cefotetan)  - F/u bilirubin and hepatic function panel this AM  - Appreciate GI input  - OR planning for cholecystectomy pending   Will continue to follow    Please contact [facility] (p. [phone]) with any questions.     Surgery, [facility]   Pager [phone]  [facility]

## 2020-11-13 NOTE — PROGRESS NOTE ADULT - ASSESSMENT
40 year old female with PMHx of H. pylori gastritis, fibroids s/p hysterectomy 2017, and recently diagnosed gallstones, who now presents with right upper quadrant abdominal pain day with transaminitis w/ hyperbilirubinemia likely 2/2 to choledocholithiasis

## 2020-11-13 NOTE — CHART NOTE - NSCHARTNOTEFT_GEN_A_CORE
General Surgery Preoperative Note    Preop Dx: cholelithiasis   Surgeon: Dr. Riley   Procedure: Laparoscopic cholecystectomy with intraoperative cholangiogram, possible open    Vital Signs Last 24 Hrs  T(C): 36.7 (13 Nov 2020 14:58), Max: 36.8 (13 Nov 2020 05:20)  T(F): 98 (13 Nov 2020 14:58), Max: 98.2 (13 Nov 2020 05:20)  HR: 71 (13 Nov 2020 14:58) (65 - 87)  BP: 100/63 (13 Nov 2020 14:58) (100/63 - 131/75)  BP(mean): --  RR: 18 (13 Nov 2020 14:58) (15 - 19)  SpO2: 100% (13 Nov 2020 14:58) (95% - 100%)                        12.4   8.22  )-----------( 244      ( 13 Nov 2020 07:01 )             36.9     11-13    139  |  106  |  6<L>  ----------------------------<  89  4.1   |  22  |  0.60    Ca    9.4      13 Nov 2020 07:01  Phos  2.9     11-13  Mg     2.1     11-13    TPro  6.8  /  Alb  3.6  /  TBili  0.9  /  DBili  0.4<H>  /  AST  161<H>  /  ALT  527<H>  /  AlkPhos  216<H>  11-13    PT/INR - ( 13 Nov 2020 07:01 )   PT: 12.2 sec;   INR: 1.02 ratio         PTT - ( 13 Nov 2020 07:01 )  PTT:31.9 sec  Daily Height in cm: 162.56 (13 Nov 2020 04:43)    Daily     EKG: normal 11/10/20   CXR: clear lungs 10/31/20  Type and Screen: in chart  COVID: negative 11/12/20  Pregnancy Test: negative 11/12/20        A/P: 40y Female presenting with choledocholithiasis, 11/12/20 s/p ERCP with stone removal, sphincterotomy, and balloon evacuation, going to OR tomorrow 11/14/20 for Laparoscopic cholecystectomy with intraoperative cholangiogram, possible open.    - OR 11/14/20 for Laparoscopic cholecystectomy with intraoperative cholangiogram, possible open with Dr. Riley  - NPO past midnight, except medications  - IVF while NPO  - Morning Labs: CBC, BMP, coags, type & screen  - Consent signed and in chart      [facility] [id number]

## 2020-11-13 NOTE — PROGRESS NOTE ADULT - SUBJECTIVE AND OBJECTIVE BOX
Gomez Zamora  PGY1/Medicine/[id number]/[phone]    ROSENDO FORRESTER  40y  Female      Patient is a 40y old  Female who presents with a chief complaint of RUQ pain w/ transaminitis (13 Nov 2020 04:51)      INTERVAL HPI/OVERNIGHT EVENTS/ROS: No acute events overnight. Pt reports feeling much better after ERCP. States that abdominal pain is pretty much resolved. Has mild nausea without vomiting. Denies cp/sob/f/c/n/v/diarrhea/dysuria.     Vital Signs Last 24 Hrs  T(C): 36.8 (13 Nov 2020 05:20), Max: 36.8 (13 Nov 2020 05:20)  T(F): 98.2 (13 Nov 2020 05:20), Max: 98.2 (13 Nov 2020 05:20)  HR: 65 (13 Nov 2020 05:20) (64 - 87)  BP: 115/70 (13 Nov 2020 05:20) (112/80 - 131/75)  BP(mean): --  RR: 18 (13 Nov 2020 05:20) (15 - 19)  SpO2: 98% (13 Nov 2020 05:20) (95% - 100%)    PHYSICAL EXAM:  GENERAL: NAD, lying in bed comfortably  HEAD:  Atraumatic, Normocephalic  EYES: EOMI, PERRLA, conjunctiva and sclera clear  ENT: Moist mucous membranes  NECK: Supple, No JVD  CHEST/LUNG: Clear to auscultation bilaterally; No rales, rhonchi, wheezing, or rubs. Unlabored respirations  HEART: Regular rate and rhythm; No murmurs, rubs, or gallops  ABDOMEN: Bowel sounds present; no TTP; campbell's negative  EXTREMITIES:  2+ Peripheral Pulses, brisk capillary refill. No clubbing, cyanosis, or edema  NERVOUS SYSTEM:  Alert & Oriented X3, speech clear. No deficits   MSK: FROM all 4 extremities, full and equal strength    Consultant(s) Notes Reviewed:  [x ] YES  [ ] NO  Care Discussed with Consultants/Other Providers [ x] YES  [ ] NO    LABS:                        12.4   8.22  )-----------( 244      ( 13 Nov 2020 07:01 )             36.9     11-13    139  |  106  |  6<L>  ----------------------------<  89  4.1   |  22  |  0.60    Ca    9.4      13 Nov 2020 07:01  Phos  2.9     11-13  Mg     2.1     11-13    TPro  6.8  /  Alb  3.6  /  TBili  0.9  /  DBili  0.4<H>  /  AST  161<H>  /  ALT  527<H>  /  AlkPhos  216<H>  11-13    PT/INR - ( 13 Nov 2020 07:01 )   PT: 12.2 sec;   INR: 1.02 ratio         PTT - ( 13 Nov 2020 07:01 )  PTT:31.9 sec      CAPILLARY BLOOD GLUCOSE          RADIOLOGY & ADDITIONAL TESTS:    Imaging Personally Reviewed:  [ ] YES  [ ] NO

## 2020-11-13 NOTE — PROGRESS NOTE ADULT - ATTENDING COMMENTS
I have seen and examined the patient.  I agree with the surgical resident's note.  Results of ERCP noted - abd is benign - discussed lap GB with ICG with the patient and the possibility of an open cholecystectomy - OR time requested for today    Georgi Doyle contact information [phone]

## 2020-11-13 NOTE — PROGRESS NOTE ADULT - ATTENDING COMMENTS
Agree with resident documentation above except as noted:    No acute events.  s/p EUS/ERCP with sphincterotomy and small CBD stone removal yesterday. Awaiting surgery recs Re: timing of cholecystectomy.  This AM pt reports improvement in abdominal pain. C/o headache but likely due to NPO status. Had small BM yesterday. On exam, no rebound or guarding. Abdomen soft. BS +  Labs reviewed: Transaminitis improving.  -Fioricet X 1 for headache. Avoiding tylenol and NSAIDs for now given transaminitis/side effect of gastritis  -trend LFTs daily  -on cefotetan per Surgery recs; f/u final blood cx results  -Appreciate GI recs; outpatient followup

## 2020-11-14 ENCOUNTER — TRANSCRIPTION ENCOUNTER (OUTPATIENT)
Age: 40
End: 2020-11-14

## 2020-11-14 ENCOUNTER — APPOINTMENT (OUTPATIENT)
Dept: SURGERY | Facility: HOSPITAL | Age: 40
End: 2020-11-14
Payer: COMMERCIAL

## 2020-11-14 ENCOUNTER — RESULT REVIEW (OUTPATIENT)
Age: 40
End: 2020-11-14

## 2020-11-14 DIAGNOSIS — K80.50 CALCULUS OF BILE DUCT WITHOUT CHOLANGITIS OR CHOLECYSTITIS WITHOUT OBSTRUCTION: ICD-10-CM

## 2020-11-14 LAB
ALBUMIN SERPL ELPH-MCNC: 3.5 G/DL — SIGNIFICANT CHANGE UP (ref 3.3–5)
ALP SERPL-CCNC: 182 U/L — HIGH (ref 40–120)
ALT FLD-CCNC: 484 U/L — HIGH (ref 10–45)
ANION GAP SERPL CALC-SCNC: 9 MMOL/L — SIGNIFICANT CHANGE UP (ref 5–17)
APTT BLD: 30.4 SEC — SIGNIFICANT CHANGE UP (ref 27.5–35.5)
AST SERPL-CCNC: 213 U/L — HIGH (ref 10–40)
BILIRUB DIRECT SERPL-MCNC: 0.2 MG/DL — SIGNIFICANT CHANGE UP (ref 0–0.2)
BILIRUB INDIRECT FLD-MCNC: 0.4 MG/DL — SIGNIFICANT CHANGE UP (ref 0.2–1)
BILIRUB SERPL-MCNC: 0.6 MG/DL — SIGNIFICANT CHANGE UP (ref 0.2–1.2)
BUN SERPL-MCNC: 8 MG/DL — SIGNIFICANT CHANGE UP (ref 7–23)
CALCIUM SERPL-MCNC: 9 MG/DL — SIGNIFICANT CHANGE UP (ref 8.4–10.5)
CHLORIDE SERPL-SCNC: 105 MMOL/L — SIGNIFICANT CHANGE UP (ref 96–108)
CO2 SERPL-SCNC: 25 MMOL/L — SIGNIFICANT CHANGE UP (ref 22–31)
CREAT SERPL-MCNC: 0.71 MG/DL — SIGNIFICANT CHANGE UP (ref 0.5–1.3)
GLUCOSE SERPL-MCNC: 87 MG/DL — SIGNIFICANT CHANGE UP (ref 70–99)
HCG SERPL-ACNC: <2 MIU/ML — SIGNIFICANT CHANGE UP
HCT VFR BLD CALC: 35.1 % — SIGNIFICANT CHANGE UP (ref 34.5–45)
HGB BLD-MCNC: 11.6 G/DL — SIGNIFICANT CHANGE UP (ref 11.5–15.5)
INR BLD: 1 RATIO — SIGNIFICANT CHANGE UP (ref 0.88–1.16)
MAGNESIUM SERPL-MCNC: 2 MG/DL — SIGNIFICANT CHANGE UP (ref 1.6–2.6)
MCHC RBC-ENTMCNC: 28.4 PG — SIGNIFICANT CHANGE UP (ref 27–34)
MCHC RBC-ENTMCNC: 33 GM/DL — SIGNIFICANT CHANGE UP (ref 32–36)
MCV RBC AUTO: 86 FL — SIGNIFICANT CHANGE UP (ref 80–100)
NRBC # BLD: 0 /100 WBCS — SIGNIFICANT CHANGE UP (ref 0–0)
PHOSPHATE SERPL-MCNC: 3.9 MG/DL — SIGNIFICANT CHANGE UP (ref 2.5–4.5)
PLATELET # BLD AUTO: 227 K/UL — SIGNIFICANT CHANGE UP (ref 150–400)
POTASSIUM SERPL-MCNC: 3.4 MMOL/L — LOW (ref 3.5–5.3)
POTASSIUM SERPL-SCNC: 3.4 MMOL/L — LOW (ref 3.5–5.3)
PROT SERPL-MCNC: 6.6 G/DL — SIGNIFICANT CHANGE UP (ref 6–8.3)
PROTHROM AB SERPL-ACNC: 12 SEC — SIGNIFICANT CHANGE UP (ref 10.6–13.6)
RBC # BLD: 4.08 M/UL — SIGNIFICANT CHANGE UP (ref 3.8–5.2)
RBC # FLD: 13.3 % — SIGNIFICANT CHANGE UP (ref 10.3–14.5)
SODIUM SERPL-SCNC: 139 MMOL/L — SIGNIFICANT CHANGE UP (ref 135–145)
WBC # BLD: 6.18 K/UL — SIGNIFICANT CHANGE UP (ref 3.8–10.5)
WBC # FLD AUTO: 6.18 K/UL — SIGNIFICANT CHANGE UP (ref 3.8–10.5)

## 2020-11-14 PROCEDURE — 88304 TISSUE EXAM BY PATHOLOGIST: CPT | Mod: 26

## 2020-11-14 PROCEDURE — 47562 LAPAROSCOPIC CHOLECYSTECTOMY: CPT

## 2020-11-14 RX ORDER — SODIUM CHLORIDE 9 MG/ML
1000 INJECTION, SOLUTION INTRAVENOUS
Refills: 0 | Status: DISCONTINUED | OUTPATIENT
Start: 2020-11-14 | End: 2020-11-14

## 2020-11-14 RX ORDER — OXYCODONE HYDROCHLORIDE 5 MG/1
5 TABLET ORAL EVERY 4 HOURS
Refills: 0 | Status: DISCONTINUED | OUTPATIENT
Start: 2020-11-14 | End: 2020-11-17

## 2020-11-14 RX ORDER — HYDROMORPHONE HYDROCHLORIDE 2 MG/ML
0.5 INJECTION INTRAMUSCULAR; INTRAVENOUS; SUBCUTANEOUS
Refills: 0 | Status: DISCONTINUED | OUTPATIENT
Start: 2020-11-14 | End: 2020-11-14

## 2020-11-14 RX ORDER — ONDANSETRON 8 MG/1
4 TABLET, FILM COATED ORAL ONCE
Refills: 0 | Status: DISCONTINUED | OUTPATIENT
Start: 2020-11-14 | End: 2020-11-14

## 2020-11-14 RX ORDER — FAMOTIDINE 10 MG/ML
20 INJECTION INTRAVENOUS DAILY
Refills: 0 | Status: DISCONTINUED | OUTPATIENT
Start: 2020-11-14 | End: 2020-11-17

## 2020-11-14 RX ORDER — SODIUM CHLORIDE 9 MG/ML
1000 INJECTION, SOLUTION INTRAVENOUS
Refills: 0 | Status: DISCONTINUED | OUTPATIENT
Start: 2020-11-14 | End: 2020-11-16

## 2020-11-14 RX ORDER — POLYETHYLENE GLYCOL 3350 17 G/17G
17 POWDER, FOR SOLUTION ORAL DAILY
Refills: 0 | Status: DISCONTINUED | OUTPATIENT
Start: 2020-11-14 | End: 2020-11-17

## 2020-11-14 RX ORDER — PANTOPRAZOLE SODIUM 20 MG/1
40 TABLET, DELAYED RELEASE ORAL
Refills: 0 | Status: DISCONTINUED | OUTPATIENT
Start: 2020-11-14 | End: 2020-11-17

## 2020-11-14 RX ORDER — POTASSIUM CHLORIDE 20 MEQ
40 PACKET (EA) ORAL ONCE
Refills: 0 | Status: COMPLETED | OUTPATIENT
Start: 2020-11-14 | End: 2020-11-14

## 2020-11-14 RX ORDER — POTASSIUM CHLORIDE 20 MEQ
40 PACKET (EA) ORAL ONCE
Refills: 0 | Status: DISCONTINUED | OUTPATIENT
Start: 2020-11-14 | End: 2020-11-14

## 2020-11-14 RX ORDER — ACETAMINOPHEN 500 MG
650 TABLET ORAL EVERY 6 HOURS
Refills: 0 | Status: DISCONTINUED | OUTPATIENT
Start: 2020-11-14 | End: 2020-11-17

## 2020-11-14 RX ORDER — SENNA PLUS 8.6 MG/1
2 TABLET ORAL AT BEDTIME
Refills: 0 | Status: DISCONTINUED | OUTPATIENT
Start: 2020-11-14 | End: 2020-11-17

## 2020-11-14 RX ORDER — OXYCODONE HYDROCHLORIDE 5 MG/1
1 TABLET ORAL
Qty: 12 | Refills: 0
Start: 2020-11-14 | End: 2020-11-15

## 2020-11-14 RX ORDER — ENOXAPARIN SODIUM 100 MG/ML
40 INJECTION SUBCUTANEOUS DAILY
Refills: 0 | Status: DISCONTINUED | OUTPATIENT
Start: 2020-11-14 | End: 2020-11-17

## 2020-11-14 RX ADMIN — HYDROMORPHONE HYDROCHLORIDE 0.5 MILLIGRAM(S): 2 INJECTION INTRAMUSCULAR; INTRAVENOUS; SUBCUTANEOUS at 11:35

## 2020-11-14 RX ADMIN — OXYCODONE HYDROCHLORIDE 5 MILLIGRAM(S): 5 TABLET ORAL at 14:43

## 2020-11-14 RX ADMIN — HYDROMORPHONE HYDROCHLORIDE 0.5 MILLIGRAM(S): 2 INJECTION INTRAMUSCULAR; INTRAVENOUS; SUBCUTANEOUS at 10:49

## 2020-11-14 RX ADMIN — OXYCODONE HYDROCHLORIDE 5 MILLIGRAM(S): 5 TABLET ORAL at 19:40

## 2020-11-14 RX ADMIN — OXYCODONE HYDROCHLORIDE 5 MILLIGRAM(S): 5 TABLET ORAL at 15:13

## 2020-11-14 RX ADMIN — Medication 650 MILLIGRAM(S): at 14:38

## 2020-11-14 RX ADMIN — SODIUM CHLORIDE 75 MILLILITER(S): 9 INJECTION, SOLUTION INTRAVENOUS at 11:30

## 2020-11-14 RX ADMIN — OXYCODONE HYDROCHLORIDE 5 MILLIGRAM(S): 5 TABLET ORAL at 23:24

## 2020-11-14 RX ADMIN — Medication 650 MILLIGRAM(S): at 21:52

## 2020-11-14 RX ADMIN — HYDROMORPHONE HYDROCHLORIDE 0.5 MILLIGRAM(S): 2 INJECTION INTRAMUSCULAR; INTRAVENOUS; SUBCUTANEOUS at 11:00

## 2020-11-14 RX ADMIN — HYDROMORPHONE HYDROCHLORIDE 0.5 MILLIGRAM(S): 2 INJECTION INTRAMUSCULAR; INTRAVENOUS; SUBCUTANEOUS at 12:15

## 2020-11-14 RX ADMIN — OXYCODONE HYDROCHLORIDE 5 MILLIGRAM(S): 5 TABLET ORAL at 19:01

## 2020-11-14 RX ADMIN — Medication 40 MILLIEQUIVALENT(S): at 15:41

## 2020-11-14 RX ADMIN — OXYCODONE HYDROCHLORIDE 5 MILLIGRAM(S): 5 TABLET ORAL at 23:58

## 2020-11-14 RX ADMIN — HYDROMORPHONE HYDROCHLORIDE 0.5 MILLIGRAM(S): 2 INJECTION INTRAMUSCULAR; INTRAVENOUS; SUBCUTANEOUS at 11:24

## 2020-11-14 RX ADMIN — ENOXAPARIN SODIUM 40 MILLIGRAM(S): 100 INJECTION SUBCUTANEOUS at 14:38

## 2020-11-14 RX ADMIN — PANTOPRAZOLE SODIUM 40 MILLIGRAM(S): 20 TABLET, DELAYED RELEASE ORAL at 14:38

## 2020-11-14 RX ADMIN — HYDROMORPHONE HYDROCHLORIDE 0.5 MILLIGRAM(S): 2 INJECTION INTRAMUSCULAR; INTRAVENOUS; SUBCUTANEOUS at 12:05

## 2020-11-14 RX ADMIN — FAMOTIDINE 20 MILLIGRAM(S): 10 INJECTION INTRAVENOUS at 14:38

## 2020-11-14 NOTE — DISCHARGE NOTE PROVIDER - NSDCFUSCHEDAPPT_GEN_ALL_CORE_FT
[facility] ; 11/18/2020 ; NPP Gen Surg [address]   [facility] ; 12/14/2020 ; [facility] [place] [address] [facility] ; [facility] ; NPP OB/GYN  [address] [facility] ; 12/14/2020 ; NPP [place] [address]  [facility] ; 01/04/2021 ; NPP OB/GYN  [address]

## 2020-11-14 NOTE — DISCHARGE NOTE PROVIDER - NSDCFUADDINST_GEN_ALL_CORE_FT
WOUND CARE:  Please keep incisions clean and dry. Please do not Scrub or rub incisions. Do not use lotion or powder on incisions.   BATHING: Please do not submerge wound underwater. You may shower and/or sponge bathe.  ACTIVITY: No heavy lifting or straining. Otherwise, you may return to your usual level of physical activity. If you are taking narcotic pain medication (such as Percocet) DO NOT drive a car, operate machinery or make important decisions.  DIET: Return to your usual diet.  NOTIFY YOUR SURGEON IF: You have any bleeding that does not stop, any pus draining from your wound(s), any fever (over 100.4 F) or chills, persistent nausea/vomiting, persistent diarrhea, or if your pain is not controlled on your discharge pain medications.  FOLLOW-UP: Please follow up with your primary care physician in one week regarding your hospitalization. Please follow-up with your surgeon, within 14 days following discharge- please call to schedule an appointment.   Please call Dr. Hernandez's office to schedule gastroenterology follow-up.

## 2020-11-14 NOTE — CHART NOTE - NSCHARTNOTEFT_GEN_A_CORE
POST-OPERATIVE NOTE    Patient is s/p laparoscopic cholecystectomy    Subjective:  Patient reports right upper quadrant and flank pain, difficult to control with medications  Denies chest pain, shortness of breath  Some nausea, no vomiting  Is not yet passing gas or having bowel movements  Not yet voiding independently or ambulating    Vital Signs Last 24 Hrs  T(C): 36.9 (15 Nov 2020 01:09), Max: 37 (14 Nov 2020 21:37)  T(F): 98.5 (15 Nov 2020 01:09), Max: 98.6 (14 Nov 2020 21:37)  HR: 96 (15 Nov 2020 01:09) (69 - 96)  BP: 123/78 (15 Nov 2020 01:09) (117/76 - 136/83)  BP(mean): 94 (14 Nov 2020 13:00) (93 - 105)  RR: 18 (15 Nov 2020 01:09) (14 - 18)  SpO2: 97% (15 Nov 2020 01:09) (96% - 100%)  I&O's Detail    13 Nov 2020 07:01  -  14 Nov 2020 07:00  --------------------------------------------------------  IN:    Lactated Ringers: 360 mL    Oral Fluid: 240 mL  Total IN: 600 mL    OUT:  Total OUT: 0 mL    Total NET: 600 mL      14 Nov 2020 07:01  -  15 Nov 2020 03:56  --------------------------------------------------------  IN:    Lactated Ringers: 150 mL  Total IN: 150 mL    OUT:    Voided (mL): 700 mL  Total OUT: 700 mL    Total NET: -550 mL        enoxaparin Injectable 40    PAST MEDICAL & SURGICAL HISTORY:  H pylori ulcer    Lumbar disc herniation  physical therapy    Patient is Jehovah&#x27;s Witness  not completely baptized, will accept blood products at this time, patient doesn&#x27;t have health care proxy    Submucous leiomyoma of uterus    H/O myomectomy  hysteroscopic, 4/2017    Lipoma of back  s/p excision    H/O bilateral breast reduction surgery    Encounter for Essure implantation  s/p essure placement, over 12 years ago          Physical Exam:  General: NAD, resting comfortably in bed  Pulmonary: Nonlabored breathing, no respiratory distress, CTAB  Cardiovascular: NSR, no murmurs or rubs  Abdominal: soft, appropriately tender, nondistended. Incisions CDI.  Extremities: WWP      LABS:                        11.6   6.18  )-----------( 227      ( 14 Nov 2020 05:43 )             35.1     11-14    139  |  105  |  8   ----------------------------<  87  3.4<L>   |  25  |  0.71    Ca    9.0      14 Nov 2020 05:43  Phos  3.9     11-14  Mg     2.0     11-14    TPro  6.6  /  Alb  3.5  /  TBili  0.6  /  DBili  0.2  /  AST  213<H>  /  ALT  484<H>  /  AlkPhos  182<H>  11-14    PT/INR - ( 14 Nov 2020 05:43 )   PT: 12.0 sec;   INR: 1.00 ratio         PTT - ( 14 Nov 2020 05:43 )  PTT:30.4 sec  CAPILLARY BLOOD GLUCOSE          Radiology and Additional Studies:    Assessment:  The patient is a 40y Female who is now several hours post-op from a lap cholecystectomy, with some pain and will stay overnight    Plan:  - Pain control as needed, continue pain meds, transition to oral pain medications  - Advance diet as tolerated  - DVT ppx  - OOB and ambulating as tolerated  - F/u AM labs    Philippe, [email or web address]

## 2020-11-14 NOTE — PROGRESS NOTE ADULT - SUBJECTIVE AND OBJECTIVE BOX
Gomez Zamora  PGY1/Medicine/[id number]/[phone]    ROSENDO FORRESTER  40y  Female      Patient is a 40y old  Female who presents with a chief complaint of RUQ pain w/ transaminitis (14 Nov 2020 08:04)      INTERVAL HPI/OVERNIGHT EVENTS/ROS:    Vital Signs Last 24 Hrs  T(C): 36.4 (14 Nov 2020 08:36), Max: 36.8 (14 Nov 2020 01:22)  T(F): 97.5 (14 Nov 2020 07:25), Max: 98.2 (14 Nov 2020 01:22)  HR: 77 (14 Nov 2020 08:36) (64 - 80)  BP: 135/84 (14 Nov 2020 08:36) (100/63 - 135/84)  BP(mean): --  RR: 16 (14 Nov 2020 08:36) (16 - 18)  SpO2: 100% (14 Nov 2020 08:36) (98% - 100%)    PHYSICAL EXAM:    Consultant(s) Notes Reviewed:  [x ] YES  [ ] NO  Care Discussed with Consultants/Other Providers [ x] YES  [ ] NO    LABS:                        11.6   6.18  )-----------( 227      ( 14 Nov 2020 05:43 )             35.1     11-14    139  |  105  |  8   ----------------------------<  87  3.4<L>   |  25  |  0.71    Ca    9.0      14 Nov 2020 05:43  Phos  3.9     11-14  Mg     2.0     11-14    TPro  6.6  /  Alb  3.5  /  TBili  0.6  /  DBili  0.2  /  AST  213<H>  /  ALT  484<H>  /  AlkPhos  182<H>  11-14    PT/INR - ( 14 Nov 2020 05:43 )   PT: 12.0 sec;   INR: 1.00 ratio         PTT - ( 14 Nov 2020 05:43 )  PTT:30.4 sec      CAPILLARY BLOOD GLUCOSE          RADIOLOGY & ADDITIONAL TESTS:    Imaging Personally Reviewed:  [ ] YES  [ ] NO

## 2020-11-14 NOTE — DISCHARGE NOTE PROVIDER - NSDCMRMEDTOKEN_GEN_ALL_CORE_FT
omeprazole 20 mg oral delayed release tablet: 1 tab(s) orally once a day  oxyCODONE 5 mg oral tablet: 1 tab(s) orally every 4 hours MDD:6 tabs

## 2020-11-14 NOTE — PRE-OP CHECKLIST - 1.
Pt says she is not very Caodaism-minded but  might want to enforce Jehovah witnesses' contraindication to receiving bloods. However at this time she is allowing bloods.
Preop teaching and emotional support provided for patient

## 2020-11-14 NOTE — DISCHARGE NOTE PROVIDER - NSDCCPCAREPLAN_GEN_ALL_CORE_FT
PRINCIPAL DISCHARGE DIAGNOSIS  Diagnosis: Abdominal pain, unspecified abdominal location  Assessment and Plan of Treatment:        PRINCIPAL DISCHARGE DIAGNOSIS  Diagnosis: Abdominal pain, unspecified abdominal location  Assessment and Plan of Treatment: WOUND CARE: Steri-strips have been applied to your incisions.  Do not remove these.  They will fall off as they get wet; in approximately 7-10 days.   BATHING: Please do not submerge wound underwater. You may shower and/or sponge bathe.  ACTIVITY: No heavy lifting or straining. Otherwise, you may return to your usual level of physical activity. If you are taking narcotic pain medication (such as Percocet), do NOT drive a car, operate machinery or make important decisions.  DIET: Return to your usual diet.  NOTIFY YOUR SURGEON IF: You have any bleeding that does not stop, any pus draining from your wound, any fever (over 100.4 F) or chills, persistent nausea/vomiting, persistent diarrhea, or if your pain is not controlled on your discharge pain medications.  FOLLOW-UP:  1. Follow-up with Dr. Riley within 1-2 weeks of discharge.  Please call office for appointment  2. Please follow up with your primary care physician in one week regarding your hospitalization.

## 2020-11-14 NOTE — DISCHARGE NOTE PROVIDER - CARE PROVIDERS DIRECT ADDRESSES
[email or web address]

## 2020-11-14 NOTE — DISCHARGE NOTE PROVIDER - HOSPITAL COURSE
The patient was admitted 11/10 with abdominal pain and elevated liver function tests. She was taken for ERCP with gastroenterology and underwent sphincterotomy and balloon sweep stone extractions. She improved clinically and was taken 11/14 for laparoscopic cholecystectomy, which she tolerated well. At the time of discharge, her pain was controlled, she was tolerating diet, voiding, and ambulating. The patient was admitted 11/10 with abdominal pain and elevated liver function tests. She was taken for ERCP with gastroenterology and underwent sphincterotomy and balloon sweep stone extractions. She improved clinically and was taken 11/14 for laparoscopic cholecystectomy, which she tolerated well.   LFT's were uptrending, MRCP performed 11/6 which showed --------------------------------  At the time of discharge, her pain was controlled, she was tolerating diet, voiding, and ambulating.  She will follow-up with Dr. Riley within 1-2 weeks. The patient was admitted 11/10 with abdominal pain and elevated liver function tests. She was taken for ERCP with gastroenterology and underwent sphincterotomy and balloon sweep stone extractions. She improved clinically and was taken 11/14 for laparoscopic cholecystectomy, which she tolerated well.   LFT's were uptrending, MRCP performed 11/6 which showed no choledocholithiasis.  At the time of discharge, her pain was controlled, she was tolerating diet, voiding, and ambulating.  She will follow-up with Dr. Riley within 1-2 weeks.

## 2020-11-14 NOTE — PROGRESS NOTE ADULT - ASSESSMENT
40 year old female with PMHx of H. pylori gastritis, fibroids s/p hysterectomy 2017, and recently diagnosed gallstones, who now presents with right upper quadrant abdominal pain day with transaminitis w/ hyperbilirubinemia likely 2/2 to choledocholithiasis.

## 2020-11-14 NOTE — BRIEF OPERATIVE NOTE - OPERATION/FINDINGS
Laparoscopic cholecystectomy. Critical view obtained. Artery and duct taken with Hem-o-candie clips.

## 2020-11-14 NOTE — DISCHARGE NOTE PROVIDER - CARE PROVIDER_API CALL
Daniela Trinidad)  Gastroenterology  [address], [address]  [place] [address]  Phone: [phone]  Fax: [phone]  Follow Up Time:     Anette Riley  COLON/RECTAL SURGERY  [address], [address]  [place] [address]  Phone: [phone]  Fax: [phone]  Follow Up Time:

## 2020-11-14 NOTE — PROGRESS NOTE ADULT - ATTENDING COMMENTS
Kae c/o.  LFts trending down.  For sandra castillo, possible open, discussed details, R/B/A of the procedure with the pt.

## 2020-11-14 NOTE — BRIEF OPERATIVE NOTE - NSICDXBRIEFPOSTOP_GEN_ALL_CORE_FT
POST-OP DIAGNOSIS:  Choledocholithiasis with chronic cholecystitis 14-Nov-2020 10:36:47  Hipolito Nolen

## 2020-11-14 NOTE — PROGRESS NOTE ADULT - PROBLEM SELECTOR PLAN 1
- Presented with recurrent abdominal pain 2/2 choldocholithiasis  - being evaluated by general surgery outpt for elective anna  - associated transaminitis w/ hyperbilirubinemia  - RUQ ultrasound shows cholelithiasis w/o cholecystitis  - symptomatic pain management  - hepatitis panel and ebv/cmv/vzv/hsv neg  - MRCP is positive only for gallstones / no evidence of choledocho  - s/p ERCP, had distal CBD stone extracted  - surg following --> cholecystectomy today

## 2020-11-15 LAB
ALBUMIN SERPL ELPH-MCNC: 3.8 G/DL — SIGNIFICANT CHANGE UP (ref 3.3–5)
ALP SERPL-CCNC: 176 U/L — HIGH (ref 40–120)
ALT FLD-CCNC: 420 U/L — HIGH (ref 10–45)
ANION GAP SERPL CALC-SCNC: 13 MMOL/L — SIGNIFICANT CHANGE UP (ref 5–17)
AST SERPL-CCNC: 139 U/L — HIGH (ref 10–40)
BILIRUB SERPL-MCNC: 0.6 MG/DL — SIGNIFICANT CHANGE UP (ref 0.2–1.2)
BUN SERPL-MCNC: 8 MG/DL — SIGNIFICANT CHANGE UP (ref 7–23)
CALCIUM SERPL-MCNC: 9.1 MG/DL — SIGNIFICANT CHANGE UP (ref 8.4–10.5)
CHLORIDE SERPL-SCNC: 104 MMOL/L — SIGNIFICANT CHANGE UP (ref 96–108)
CO2 SERPL-SCNC: 25 MMOL/L — SIGNIFICANT CHANGE UP (ref 22–31)
CREAT SERPL-MCNC: 0.69 MG/DL — SIGNIFICANT CHANGE UP (ref 0.5–1.3)
GLUCOSE SERPL-MCNC: 101 MG/DL — HIGH (ref 70–99)
HCT VFR BLD CALC: 35.5 % — SIGNIFICANT CHANGE UP (ref 34.5–45)
HGB BLD-MCNC: 12.1 G/DL — SIGNIFICANT CHANGE UP (ref 11.5–15.5)
MAGNESIUM SERPL-MCNC: 2 MG/DL — SIGNIFICANT CHANGE UP (ref 1.6–2.6)
MCHC RBC-ENTMCNC: 28.7 PG — SIGNIFICANT CHANGE UP (ref 27–34)
MCHC RBC-ENTMCNC: 34.1 GM/DL — SIGNIFICANT CHANGE UP (ref 32–36)
MCV RBC AUTO: 84.3 FL — SIGNIFICANT CHANGE UP (ref 80–100)
NRBC # BLD: 0 /100 WBCS — SIGNIFICANT CHANGE UP (ref 0–0)
PHOSPHATE SERPL-MCNC: 2.7 MG/DL — SIGNIFICANT CHANGE UP (ref 2.5–4.5)
PLATELET # BLD AUTO: 253 K/UL — SIGNIFICANT CHANGE UP (ref 150–400)
POTASSIUM SERPL-MCNC: 3.5 MMOL/L — SIGNIFICANT CHANGE UP (ref 3.5–5.3)
POTASSIUM SERPL-SCNC: 3.5 MMOL/L — SIGNIFICANT CHANGE UP (ref 3.5–5.3)
PROT SERPL-MCNC: 7 G/DL — SIGNIFICANT CHANGE UP (ref 6–8.3)
RBC # BLD: 4.21 M/UL — SIGNIFICANT CHANGE UP (ref 3.8–5.2)
RBC # FLD: 13.6 % — SIGNIFICANT CHANGE UP (ref 10.3–14.5)
SODIUM SERPL-SCNC: 142 MMOL/L — SIGNIFICANT CHANGE UP (ref 135–145)
WBC # BLD: 9.43 K/UL — SIGNIFICANT CHANGE UP (ref 3.8–10.5)
WBC # FLD AUTO: 9.43 K/UL — SIGNIFICANT CHANGE UP (ref 3.8–10.5)

## 2020-11-15 RX ORDER — SODIUM,POTASSIUM PHOSPHATES 278-250MG
1 POWDER IN PACKET (EA) ORAL ONCE
Refills: 0 | Status: COMPLETED | OUTPATIENT
Start: 2020-11-15 | End: 2020-11-15

## 2020-11-15 RX ORDER — KETOROLAC TROMETHAMINE 30 MG/ML
15 SYRINGE (ML) INJECTION ONCE
Refills: 0 | Status: DISCONTINUED | OUTPATIENT
Start: 2020-11-15 | End: 2020-11-15

## 2020-11-15 RX ADMIN — Medication 650 MILLIGRAM(S): at 09:19

## 2020-11-15 RX ADMIN — FAMOTIDINE 20 MILLIGRAM(S): 10 INJECTION INTRAVENOUS at 13:24

## 2020-11-15 RX ADMIN — Medication 650 MILLIGRAM(S): at 20:49

## 2020-11-15 RX ADMIN — Medication 15 MILLIGRAM(S): at 09:50

## 2020-11-15 RX ADMIN — Medication 15 MILLIGRAM(S): at 17:45

## 2020-11-15 RX ADMIN — Medication 650 MILLIGRAM(S): at 20:50

## 2020-11-15 RX ADMIN — POLYETHYLENE GLYCOL 3350 17 GRAM(S): 17 POWDER, FOR SOLUTION ORAL at 13:24

## 2020-11-15 RX ADMIN — Medication 15 MILLIGRAM(S): at 17:23

## 2020-11-15 RX ADMIN — PANTOPRAZOLE SODIUM 40 MILLIGRAM(S): 20 TABLET, DELAYED RELEASE ORAL at 05:32

## 2020-11-15 RX ADMIN — Medication 15 MILLIGRAM(S): at 09:30

## 2020-11-15 RX ADMIN — OXYCODONE HYDROCHLORIDE 5 MILLIGRAM(S): 5 TABLET ORAL at 06:00

## 2020-11-15 RX ADMIN — OXYCODONE HYDROCHLORIDE 5 MILLIGRAM(S): 5 TABLET ORAL at 05:29

## 2020-11-15 RX ADMIN — ENOXAPARIN SODIUM 40 MILLIGRAM(S): 100 INJECTION SUBCUTANEOUS at 13:24

## 2020-11-15 RX ADMIN — Medication 1 TABLET(S): at 10:28

## 2020-11-15 NOTE — PROGRESS NOTE ADULT - SUBJECTIVE AND OBJECTIVE BOX
Subjective:   Patient seen at bedside this AM. Moderate abdominal discomfort. Not tolerating much PO.  Mild nausea, no emesis.  Denies chest pain, shortness of breath.      Objective:  Vital Signs  T(C): 36.9 (11-15 @ 01:09), Max: 37 (11-14 @ 21:37)  HR: 96 (11-15 @ 01:09) (69 - 96)  BP: 123/78 (11-15 @ 01:09) (117/76 - 136/83)  RR: 18 (11-15 @ 01:09) (14 - 18)  SpO2: 97% (11-15 @ 01:09) (96% - 100%)  11-13-20 @ 07:01  -  11-14-20 @ 07:00  --------------------------------------------------------  IN: 600 mL / OUT: 0 mL / NET: 600 mL    11-14-20 @ 07:01  -  11-15-20 @ 04:01  --------------------------------------------------------  IN: 150 mL / OUT: 700 mL / NET: -550 mL        I&O's Detail    11-13-20 @ 07:01  -  11-14-20 @ 07:00  --------------------------------------------------------  IN: 600 mL / OUT: 0 mL / NET: 600 mL    11-14-20 @ 07:01  -  11-15-20 @ 04:01  --------------------------------------------------------  IN: 150 mL / OUT: 700 mL / NET: -550 mL        Physical Exam:  GEN: resting in bed comfortably in NAD  RESP: no increased WOB  ABD: soft, tender to palpation in RUQ, non distended  EXTR: warm, well-perfused    Labs:                        11.6   6.18  )-----------( 227      ( 14 Nov 2020 05:43 )             35.1   11-14    139  |  105  |  8   ----------------------------<  87  3.4<L>   |  25  |  0.71    Ca    9.0      14 Nov 2020 05:43  Phos  3.9     11-14  Mg     2.0     11-14    TPro  6.6  /  Alb  3.5  /  TBili  0.6  /  DBili  0.2  /  AST  213<H>  /  ALT  484<H>  /  AlkPhos  182<H>  11-14    CAPILLARY BLOOD GLUCOSE          Medications:   MEDICATIONS  (STANDING):  acetaminophen   Tablet .. 650 milliGRAM(s) Oral every 6 hours  enoxaparin Injectable 40 milliGRAM(s) SubCutaneous daily  famotidine    Tablet 20 milliGRAM(s) Oral daily  lactated ringers. 1000 milliLiter(s) (75 mL/Hr) IV Continuous <Continuous>  pantoprazole    Tablet 40 milliGRAM(s) Oral before breakfast  polyethylene glycol 3350 17 Gram(s) Oral daily  senna 2 Tablet(s) Oral at bedtime    MEDICATIONS  (PRN):  oxyCODONE    IR 5 milliGRAM(s) Oral every 4 hours PRN Severe Pain (7 - 10)      Imaging: Subjective:   Patient seen at bedside this AM. Moderate abdominal discomfort. Not tolerating much PO because of pain. no nausea, no emesis.  Denies chest pain, shortness of breath.      Objective:  Vital Signs  T(C): 36.9 (11-15 @ 01:09), Max: 37 (11-14 @ 21:37)  HR: 96 (11-15 @ 01:09) (69 - 96)  BP: 123/78 (11-15 @ 01:09) (117/76 - 136/83)  RR: 18 (11-15 @ 01:09) (14 - 18)  SpO2: 97% (11-15 @ 01:09) (96% - 100%)  11-13-20 @ 07:01  -  11-14-20 @ 07:00  --------------------------------------------------------  IN: 600 mL / OUT: 0 mL / NET: 600 mL    11-14-20 @ 07:01  -  11-15-20 @ 04:01  --------------------------------------------------------  IN: 150 mL / OUT: 700 mL / NET: -550 mL        I&O's Detail    11-13-20 @ 07:01  -  11-14-20 @ 07:00  --------------------------------------------------------  IN: 600 mL / OUT: 0 mL / NET: 600 mL    11-14-20 @ 07:01  -  11-15-20 @ 04:01  --------------------------------------------------------  IN: 150 mL / OUT: 700 mL / NET: -550 mL        Physical Exam:  GEN: resting in bed comfortably in NAD  RESP: no increased WOB  ABD: soft, tender to palpation in RUQ, non distended  EXTR: warm, well-perfused    Labs:                        11.6   6.18  )-----------( 227      ( 14 Nov 2020 05:43 )             35.1   11-14    139  |  105  |  8   ----------------------------<  87  3.4<L>   |  25  |  0.71    Ca    9.0      14 Nov 2020 05:43  Phos  3.9     11-14  Mg     2.0     11-14    TPro  6.6  /  Alb  3.5  /  TBili  0.6  /  DBili  0.2  /  AST  213<H>  /  ALT  484<H>  /  AlkPhos  182<H>  11-14    CAPILLARY BLOOD GLUCOSE          Medications:   MEDICATIONS  (STANDING):  acetaminophen   Tablet .. 650 milliGRAM(s) Oral every 6 hours  enoxaparin Injectable 40 milliGRAM(s) SubCutaneous daily  famotidine    Tablet 20 milliGRAM(s) Oral daily  lactated ringers. 1000 milliLiter(s) (75 mL/Hr) IV Continuous <Continuous>  pantoprazole    Tablet 40 milliGRAM(s) Oral before breakfast  polyethylene glycol 3350 17 Gram(s) Oral daily  senna 2 Tablet(s) Oral at bedtime    MEDICATIONS  (PRN):  oxyCODONE    IR 5 milliGRAM(s) Oral every 4 hours PRN Severe Pain (7 - 10)      Imaging:

## 2020-11-15 NOTE — PROGRESS NOTE ADULT - ASSESSMENT
40F pod 1 s/p lap cholecystectomy, May go later today if feeling well    - Advance diet  - PO pain meds  - F/u labs  - DVT ppx  - OOBAT  - Dispo planning    Green surgery  [id number] 40F pod 1 s/p lap cholecystectomy, May go later today if feeling well    - Advance diet  - PO pain meds  - F/u labs  - DVT ppx  - OOBAT  - Dispo planning    Green surgery  [id number]

## 2020-11-15 NOTE — PROGRESS NOTE ADULT - ATTENDING COMMENTS
I saw and examined the pt and discussed the tx plan with the House Staff. I agree with the exam and plan as documented in the surgery resident's note from today with comments/changes below.  C/o abdominal pain. no nausea but po intake makes the pain worse. + R shoulder pain.  Abdomen soft, with vague mild tenderness on exam in epigastric and RUQ area.  LFTs continue to trend down.  Her pain appears to be postop pain.  Will give a dose of Toradol 15 mg.  Continue to monitor.  PO intake as tolerated.  Anette Riley MD

## 2020-11-16 LAB
ALBUMIN SERPL ELPH-MCNC: 3.5 G/DL — SIGNIFICANT CHANGE UP (ref 3.3–5)
ALP SERPL-CCNC: 157 U/L — HIGH (ref 40–120)
ALT FLD-CCNC: 478 U/L — HIGH (ref 10–45)
ANION GAP SERPL CALC-SCNC: 11 MMOL/L — SIGNIFICANT CHANGE UP (ref 5–17)
AST SERPL-CCNC: 266 U/L — HIGH (ref 10–40)
BILIRUB SERPL-MCNC: 0.5 MG/DL — SIGNIFICANT CHANGE UP (ref 0.2–1.2)
BUN SERPL-MCNC: 12 MG/DL — SIGNIFICANT CHANGE UP (ref 7–23)
CALCIUM SERPL-MCNC: 9.3 MG/DL — SIGNIFICANT CHANGE UP (ref 8.4–10.5)
CHLORIDE SERPL-SCNC: 104 MMOL/L — SIGNIFICANT CHANGE UP (ref 96–108)
CO2 SERPL-SCNC: 24 MMOL/L — SIGNIFICANT CHANGE UP (ref 22–31)
CREAT SERPL-MCNC: 0.62 MG/DL — SIGNIFICANT CHANGE UP (ref 0.5–1.3)
GLUCOSE SERPL-MCNC: 94 MG/DL — SIGNIFICANT CHANGE UP (ref 70–99)
HCT VFR BLD CALC: 33.6 % — LOW (ref 34.5–45)
HGB BLD-MCNC: 11.1 G/DL — LOW (ref 11.5–15.5)
MAGNESIUM SERPL-MCNC: 2.2 MG/DL — SIGNIFICANT CHANGE UP (ref 1.6–2.6)
MCHC RBC-ENTMCNC: 28.3 PG — SIGNIFICANT CHANGE UP (ref 27–34)
MCHC RBC-ENTMCNC: 33 GM/DL — SIGNIFICANT CHANGE UP (ref 32–36)
MCV RBC AUTO: 85.7 FL — SIGNIFICANT CHANGE UP (ref 80–100)
NRBC # BLD: 0 /100 WBCS — SIGNIFICANT CHANGE UP (ref 0–0)
PHOSPHATE SERPL-MCNC: 3.8 MG/DL — SIGNIFICANT CHANGE UP (ref 2.5–4.5)
PLATELET # BLD AUTO: 226 K/UL — SIGNIFICANT CHANGE UP (ref 150–400)
POTASSIUM SERPL-MCNC: 3.7 MMOL/L — SIGNIFICANT CHANGE UP (ref 3.5–5.3)
POTASSIUM SERPL-SCNC: 3.7 MMOL/L — SIGNIFICANT CHANGE UP (ref 3.5–5.3)
PROT SERPL-MCNC: 6.8 G/DL — SIGNIFICANT CHANGE UP (ref 6–8.3)
RBC # BLD: 3.92 M/UL — SIGNIFICANT CHANGE UP (ref 3.8–5.2)
RBC # FLD: 13.9 % — SIGNIFICANT CHANGE UP (ref 10.3–14.5)
SODIUM SERPL-SCNC: 139 MMOL/L — SIGNIFICANT CHANGE UP (ref 135–145)
WBC # BLD: 7.34 K/UL — SIGNIFICANT CHANGE UP (ref 3.8–10.5)
WBC # FLD AUTO: 7.34 K/UL — SIGNIFICANT CHANGE UP (ref 3.8–10.5)

## 2020-11-16 PROCEDURE — 74183 MRI ABD W/O CNTR FLWD CNTR: CPT | Mod: 26

## 2020-11-16 RX ADMIN — PANTOPRAZOLE SODIUM 40 MILLIGRAM(S): 20 TABLET, DELAYED RELEASE ORAL at 06:02

## 2020-11-16 NOTE — DIETITIAN INITIAL EVALUATION ADULT. - OTHER INFO
Pt NPO today. Reports tolerating diet previously, consuming >50% of meals. Advanced to Low Fat diet 11/14. Denies nausea/vomiting. Reports last BM prior to cholecystectomy (11/13 per chart review). Denies difficulties chewing or swallowing. Confirmed NKFA.     Pt denies recent weight changes. States UBW ~180 pounds. Dosing wt 197 pounds (11/10 standing) suggests wt gain. Will continue to monitor and trend weights.     Offered pt oral nutrition supplement to optimize PO intake once diet resumed, pt declined at this time. Pt declined diet education, reports being aware of Low Fat diet restrictions. Pt with no nutrition-related questions or concerns at this time. Made aware RD remains available.

## 2020-11-16 NOTE — PROGRESS NOTE ADULT - SUBJECTIVE AND OBJECTIVE BOX
Green Team Surgery Daily Progress Note    Subjective:   Patient seen at bedside this AM. Moderate abdominal discomfort. Not tolerating normal amount of PO because of pain. No nausea, no emesis. Denies chest pain, shortness of breath. Ambulating and Voiding. -/- bowel function.    24h Events:   - Overnight, no acute events    Objective:  Vital Signs  T(C): 36.4 (11-15 @ 21:00), Max: 36.9 (11-15 @ 15:57)  HR: 80 (11-15 @ 21:00) (80 - 109)  BP: 102/69 (11-15 @ 21:00) (102/69 - 145/83)  RR: 16 (11-15 @ 21:00) (16 - 19)  SpO2: 97% (11-15 @ 21:00) (95% - 98%)  11-14-20 @ 07:01  -  11-15-20 @ 07:00  --------------------------------------------------------  IN: 150 mL / OUT: 700 mL / NET: -550 mL        Physical Exam:  GEN: resting in bed comfortably in NAD  RESP: no increased WOB  ABD: soft, non-distended, RUQ tenderness without rebound tenderness or guarding  EXTR: warm, well-perfused without gross deformities; spontaneous movement in b/l U/L extrem  NEURO: AAOx4    Labs:                        12.1   9.43  )-----------( 253      ( 15 Nov 2020 07:18 )             35.5   11-15    142  |  104  |  8   ----------------------------<  101<H>  3.5   |  25  |  0.69    Ca    9.1      15 Nov 2020 07:17  Phos  2.7     11-15  Mg     2.0     11-15    TPro  7.0  /  Alb  3.8  /  TBili  0.6  /  DBili  x   /  AST  139<H>  /  ALT  420<H>  /  AlkPhos  176<H>  11-15    CAPILLARY BLOOD GLUCOSE          Medications:   MEDICATIONS  (STANDING):  acetaminophen   Tablet .. 650 milliGRAM(s) Oral every 6 hours  enoxaparin Injectable 40 milliGRAM(s) SubCutaneous daily  famotidine    Tablet 20 milliGRAM(s) Oral daily  lactated ringers. 1000 milliLiter(s) (75 mL/Hr) IV Continuous <Continuous>  pantoprazole    Tablet 40 milliGRAM(s) Oral before breakfast  polyethylene glycol 3350 17 Gram(s) Oral daily  senna 2 Tablet(s) Oral at bedtime    MEDICATIONS  (PRN):  oxyCODONE    IR 5 milliGRAM(s) Oral every 4 hours PRN Severe Pain (7 - 10)         Green Team Surgery Daily Progress Note    Subjective:   Patient seen at bedside this AM. No pain this morning. Tolerating regular diet. No nausea, no emesis. Denies chest pain, shortness of breath. Ambulating and Voiding. -/- bowel function.    24h Events:   - Overnight, no acute events    Objective:  Vital Signs  T(C): 36.4 (11-15 @ 21:00), Max: 36.9 (11-15 @ 15:57)  HR: 80 (11-15 @ 21:00) (80 - 109)  BP: 102/69 (11-15 @ 21:00) (102/69 - 145/83)  RR: 16 (11-15 @ 21:00) (16 - 19)  SpO2: 97% (11-15 @ 21:00) (95% - 98%)  11-14-20 @ 07:01  -  11-15-20 @ 07:00  --------------------------------------------------------  IN: 150 mL / OUT: 700 mL / NET: -550 mL        Physical Exam:  GEN: resting in bed comfortably in NAD  RESP: no increased WOB  ABD: soft, non-distended, RUQ tenderness without rebound tenderness or guarding  EXTR: warm, well-perfused without gross deformities; spontaneous movement in b/l U/L extrem  NEURO: AAOx4    Labs:                        12.1   9.43  )-----------( 253      ( 15 Nov 2020 07:18 )             35.5   11-15    142  |  104  |  8   ----------------------------<  101<H>  3.5   |  25  |  0.69    Ca    9.1      15 Nov 2020 07:17  Phos  2.7     11-15  Mg     2.0     11-15    TPro  7.0  /  Alb  3.8  /  TBili  0.6  /  DBili  x   /  AST  139<H>  /  ALT  420<H>  /  AlkPhos  176<H>  11-15    CAPILLARY BLOOD GLUCOSE          Medications:   MEDICATIONS  (STANDING):  acetaminophen   Tablet .. 650 milliGRAM(s) Oral every 6 hours  enoxaparin Injectable 40 milliGRAM(s) SubCutaneous daily  famotidine    Tablet 20 milliGRAM(s) Oral daily  lactated ringers. 1000 milliLiter(s) (75 mL/Hr) IV Continuous <Continuous>  pantoprazole    Tablet 40 milliGRAM(s) Oral before breakfast  polyethylene glycol 3350 17 Gram(s) Oral daily  senna 2 Tablet(s) Oral at bedtime    MEDICATIONS  (PRN):  oxyCODONE    IR 5 milliGRAM(s) Oral every 4 hours PRN Severe Pain (7 - 10)

## 2020-11-16 NOTE — DIETITIAN INITIAL EVALUATION ADULT. - PROBLEM SELECTOR PLAN 1
- Presented with recurrent abdominal pain possibly 2/2 to hepatitis vs biliary colic  - being evaluated by general surgery outpt for elective anna  - associated transaminitis w/o hyperbilirubinemia  - RUQ ultrasound show cholelithiasis w/o cholecystitis  - symptomatic pain management  - f/u hepatitis panel, added-on CMV IgM, EBV panel, and HSV IgM  - consult hepatology/GI  - CT vs MRCP

## 2020-11-16 NOTE — DIETITIAN INITIAL EVALUATION ADULT. - ORAL INTAKE PTA/DIET HISTORY
Pt reports decreased appetite and PO intake x "months" PTA. Reports she was eating less than usual, however unable to quantify. Attributes to constipation, reports seeing outpatient gastroenterologist. Denies vitamin/mineral supplementation PTA.

## 2020-11-16 NOTE — PROGRESS NOTE ADULT - ASSESSMENT
40F POD2 s/p lap cholecystectomy, continues to have RUQ pain. LFTs are trending down. Patient is recovering on floors.    - Regular low fat diet  - PO pain meds  - F/u labs and trend LFTs  - DVT ppx  - OOBAT  - Dispo planning    Green surgery  [id number]

## 2020-11-16 NOTE — DIETITIAN INITIAL EVALUATION ADULT. - CHIEF COMPLAINT
transaminitis with hyperbilirubinemia "likely 2/2 choledocholithiasis." Now POD2 s/p lap cholecystectomy (11/14).

## 2020-11-16 NOTE — DIETITIAN INITIAL EVALUATION ADULT. - REASON INDICATOR FOR ASSESSMENT
Pt seen for length of stay assessment. Source: EMR, pt. Pt is a 39 yo female with PMH of H. pylori gastritis, fibroids s/p hysterectomy (2017) and recently diagnosed gallstones, who presented with right upper quadrant abdominal pain x1 day, admitted 10/10.

## 2020-11-16 NOTE — PROGRESS NOTE ADULT - ATTENDING COMMENTS
I saw and examined the pt and discussed the tx plan with the House Staff. I agree with the exam and plan as documented in the surgery resident's note from today with comments/changes below.  No pain today. Last pain med (Toradol) last night.  Tolerating po.  Abdomen soft, NT, ND.   Stable.  Awaiting LFTs.   Anette Riley MD

## 2020-11-16 NOTE — DIETITIAN INITIAL EVALUATION ADULT. - ADD RECOMMEND
1) As medically feasible, recommend resuming Low Fat diet as tolerated. Monitor/adjust as needed. 2) Bowel regimen as per team 3) Patient made aware RD remains available. 4) Monitor PO intake, weight, labs, skin, GI status, diet

## 2020-11-16 NOTE — DIETITIAN INITIAL EVALUATION ADULT. - NUTRITION CONSULT
Message    Another attempt to contact the patient regarding his test result and I left voicemail, to informed the patient we need to repeat the test and i will add another tests to be done, without giving details, all the orders are in the systems so please advice the patient to do the tests and make an appointment after he done them all by one week so we can discuss the results    Thank you     Plan   1. FSH; Status:Active; Requested for:22Mar2017;    2. LH; Status:Active; Requested for:22Mar2017;    3. PROLACTIN; Status:Active; Requested for:22Mar2017;    4. TESTOSTERONE, MALE; Status:Active; Requested for:22Mar2017;     Signatures   Electronically signed by : SID COONEY M.D.; Mar 22 2017 12:37PM CST    
Message    Called the patient again today and left voicemail I invited him to come to ACL lab to repeat his test and performed other tests, all the orders are in the systems     Plan   1. FSH; Status:Active; Requested for:22Mar2017;    2. LH; Status:Active; Requested for:22Mar2017;    3. PROLACTIN; Status:Active; Requested for:22Mar2017;    4. TESTOSTERONE, MALE; Status:Active; Requested for:22Mar2017;     Signatures   Electronically signed by : SID COONEY M.D.; Apr 4 2017  3:19PM CST    
no

## 2020-11-17 ENCOUNTER — TRANSCRIPTION ENCOUNTER (OUTPATIENT)
Age: 40
End: 2020-11-17

## 2020-11-17 VITALS
OXYGEN SATURATION: 97 % | DIASTOLIC BLOOD PRESSURE: 68 MMHG | HEART RATE: 106 BPM | SYSTOLIC BLOOD PRESSURE: 133 MMHG | TEMPERATURE: 98 F | RESPIRATION RATE: 18 BRPM

## 2020-11-17 LAB
ALBUMIN SERPL ELPH-MCNC: 3.5 G/DL — SIGNIFICANT CHANGE UP (ref 3.3–5)
ALP SERPL-CCNC: 153 U/L — HIGH (ref 40–120)
ALT FLD-CCNC: 451 U/L — HIGH (ref 10–45)
ANION GAP SERPL CALC-SCNC: 8 MMOL/L — SIGNIFICANT CHANGE UP (ref 5–17)
AST SERPL-CCNC: 233 U/L — HIGH (ref 10–40)
BILIRUB SERPL-MCNC: 0.4 MG/DL — SIGNIFICANT CHANGE UP (ref 0.2–1.2)
BUN SERPL-MCNC: 10 MG/DL — SIGNIFICANT CHANGE UP (ref 7–23)
CALCIUM SERPL-MCNC: 9.2 MG/DL — SIGNIFICANT CHANGE UP (ref 8.4–10.5)
CHLORIDE SERPL-SCNC: 106 MMOL/L — SIGNIFICANT CHANGE UP (ref 96–108)
CO2 SERPL-SCNC: 26 MMOL/L — SIGNIFICANT CHANGE UP (ref 22–31)
CREAT SERPL-MCNC: 0.6 MG/DL — SIGNIFICANT CHANGE UP (ref 0.5–1.3)
GLUCOSE SERPL-MCNC: 89 MG/DL — SIGNIFICANT CHANGE UP (ref 70–99)
HCT VFR BLD CALC: 33.7 % — LOW (ref 34.5–45)
HGB BLD-MCNC: 11.4 G/DL — LOW (ref 11.5–15.5)
MAGNESIUM SERPL-MCNC: 2.1 MG/DL — SIGNIFICANT CHANGE UP (ref 1.6–2.6)
MCHC RBC-ENTMCNC: 28.8 PG — SIGNIFICANT CHANGE UP (ref 27–34)
MCHC RBC-ENTMCNC: 33.8 GM/DL — SIGNIFICANT CHANGE UP (ref 32–36)
MCV RBC AUTO: 85.1 FL — SIGNIFICANT CHANGE UP (ref 80–100)
NRBC # BLD: 0 /100 WBCS — SIGNIFICANT CHANGE UP (ref 0–0)
PHOSPHATE SERPL-MCNC: 4.2 MG/DL — SIGNIFICANT CHANGE UP (ref 2.5–4.5)
PLATELET # BLD AUTO: 229 K/UL — SIGNIFICANT CHANGE UP (ref 150–400)
POTASSIUM SERPL-MCNC: 3.8 MMOL/L — SIGNIFICANT CHANGE UP (ref 3.5–5.3)
POTASSIUM SERPL-SCNC: 3.8 MMOL/L — SIGNIFICANT CHANGE UP (ref 3.5–5.3)
PROT SERPL-MCNC: 6.9 G/DL — SIGNIFICANT CHANGE UP (ref 6–8.3)
RBC # BLD: 3.96 M/UL — SIGNIFICANT CHANGE UP (ref 3.8–5.2)
RBC # FLD: 13.9 % — SIGNIFICANT CHANGE UP (ref 10.3–14.5)
SODIUM SERPL-SCNC: 140 MMOL/L — SIGNIFICANT CHANGE UP (ref 135–145)
WBC # BLD: 7.86 K/UL — SIGNIFICANT CHANGE UP (ref 3.8–10.5)
WBC # FLD AUTO: 7.86 K/UL — SIGNIFICANT CHANGE UP (ref 3.8–10.5)

## 2020-11-17 PROCEDURE — 82435 ASSAY OF BLOOD CHLORIDE: CPT

## 2020-11-17 PROCEDURE — 86850 RBC ANTIBODY SCREEN: CPT

## 2020-11-17 PROCEDURE — 87040 BLOOD CULTURE FOR BACTERIA: CPT

## 2020-11-17 PROCEDURE — C1889: CPT

## 2020-11-17 PROCEDURE — 85014 HEMATOCRIT: CPT

## 2020-11-17 PROCEDURE — 96374 THER/PROPH/DIAG INJ IV PUSH: CPT

## 2020-11-17 PROCEDURE — 82803 BLOOD GASES ANY COMBINATION: CPT

## 2020-11-17 PROCEDURE — 80074 ACUTE HEPATITIS PANEL: CPT

## 2020-11-17 PROCEDURE — 86901 BLOOD TYPING SEROLOGIC RH(D): CPT

## 2020-11-17 PROCEDURE — 86695 HERPES SIMPLEX TYPE 1 TEST: CPT

## 2020-11-17 PROCEDURE — 84702 CHORIONIC GONADOTROPIN TEST: CPT

## 2020-11-17 PROCEDURE — 85730 THROMBOPLASTIN TIME PARTIAL: CPT

## 2020-11-17 PROCEDURE — C9399: CPT

## 2020-11-17 PROCEDURE — 84295 ASSAY OF SERUM SODIUM: CPT

## 2020-11-17 PROCEDURE — 86665 EPSTEIN-BARR CAPSID VCA: CPT

## 2020-11-17 PROCEDURE — 86645 CMV ANTIBODY IGM: CPT

## 2020-11-17 PROCEDURE — 83735 ASSAY OF MAGNESIUM: CPT

## 2020-11-17 PROCEDURE — 76705 ECHO EXAM OF ABDOMEN: CPT

## 2020-11-17 PROCEDURE — 74183 MRI ABD W/O CNTR FLWD CNTR: CPT

## 2020-11-17 PROCEDURE — 85027 COMPLETE CBC AUTOMATED: CPT

## 2020-11-17 PROCEDURE — 83605 ASSAY OF LACTIC ACID: CPT

## 2020-11-17 PROCEDURE — 86769 SARS-COV-2 COVID-19 ANTIBODY: CPT

## 2020-11-17 PROCEDURE — C1769: CPT

## 2020-11-17 PROCEDURE — 85018 HEMOGLOBIN: CPT

## 2020-11-17 PROCEDURE — 80048 BASIC METABOLIC PNL TOTAL CA: CPT

## 2020-11-17 PROCEDURE — 86664 EPSTEIN-BARR NUCLEAR ANTIGEN: CPT

## 2020-11-17 PROCEDURE — 82947 ASSAY GLUCOSE BLOOD QUANT: CPT

## 2020-11-17 PROCEDURE — 80076 HEPATIC FUNCTION PANEL: CPT

## 2020-11-17 PROCEDURE — 96375 TX/PRO/DX INJ NEW DRUG ADDON: CPT

## 2020-11-17 PROCEDURE — U0003: CPT

## 2020-11-17 PROCEDURE — 83690 ASSAY OF LIPASE: CPT

## 2020-11-17 PROCEDURE — 85610 PROTHROMBIN TIME: CPT

## 2020-11-17 PROCEDURE — 84100 ASSAY OF PHOSPHORUS: CPT

## 2020-11-17 PROCEDURE — A9585: CPT

## 2020-11-17 PROCEDURE — 86900 BLOOD TYPING SEROLOGIC ABO: CPT

## 2020-11-17 PROCEDURE — 74330 X-RAY BILE/PANC ENDOSCOPY: CPT

## 2020-11-17 PROCEDURE — 80053 COMPREHEN METABOLIC PANEL: CPT

## 2020-11-17 PROCEDURE — 80307 DRUG TEST PRSMV CHEM ANLYZR: CPT

## 2020-11-17 PROCEDURE — 85025 COMPLETE CBC W/AUTO DIFF WBC: CPT

## 2020-11-17 PROCEDURE — 99285 EMERGENCY DEPT VISIT HI MDM: CPT | Mod: 25

## 2020-11-17 PROCEDURE — 86787 VARICELLA-ZOSTER ANTIBODY: CPT

## 2020-11-17 PROCEDURE — 84132 ASSAY OF SERUM POTASSIUM: CPT

## 2020-11-17 PROCEDURE — 93005 ELECTROCARDIOGRAM TRACING: CPT

## 2020-11-17 PROCEDURE — 82330 ASSAY OF CALCIUM: CPT

## 2020-11-17 PROCEDURE — 88304 TISSUE EXAM BY PATHOLOGIST: CPT

## 2020-11-17 PROCEDURE — 86663 EPSTEIN-BARR ANTIBODY: CPT

## 2020-11-17 RX ADMIN — PANTOPRAZOLE SODIUM 40 MILLIGRAM(S): 20 TABLET, DELAYED RELEASE ORAL at 06:28

## 2020-11-17 NOTE — PROGRESS NOTE ADULT - ASSESSMENT
40F POD2 s/p lap cholecystectomy, continues to have RUQ pain. LFTs are trending down. Patient is recovering on floors.    - Regular low fat diet  - PO pain meds  - F/u labs and trend LFTs  - DVT ppx  - OOBAT  - Dispo planning    Green surgery  [id number] 40F POD2 s/p lap cholecystectomy, continues to have RUQ pain. LFTs are trending down. Patient is recovering on floors.    - Regular low fat diet  - PO pain meds  - await MRCP read  - f/u LFT's  - DVT ppx  - possible d/c home today if LFT's downtrending    Green surgery  [id number] 40F POD2 s/p lap cholecystectomy, continues to have RUQ pain. LFTs are trending down. Patient is recovering on floors.    - Regular low fat diet  - PO pain meds  - await MRCP read  - f/u LFT's  - DVT ppx  - d/c home today if LFT's downtrending    Green surgery  [id number] 40F POD2 s/p lap cholecystectomy, continues to have RUQ pain. LFTs are trending down. Patient is recovering on floors.    - Regular low fat diet  - PO pain meds  - await MRCP read  - f/u LFT's  - DVT ppx  - d/c home today if LFT's downtrending and pending MRCP reading    Green surgery  [id number]

## 2020-11-17 NOTE — PROGRESS NOTE ADULT - REASON FOR ADMISSION
RUQ pain w/ transaminitis

## 2020-11-17 NOTE — DISCHARGE NOTE NURSING/CASE MANAGEMENT/SOCIAL WORK - PATIENT PORTAL LINK FT
You can access the FollowMyHealth Patient Portal offered by [facility] by registering at the following website: [email or web address]. By joining [facility]’s FollowMyHealth portal, you will also be able to view your health information using other applications (apps) compatible with our system.

## 2020-11-17 NOTE — PROGRESS NOTE ADULT - ATTENDING COMMENTS
I saw and examined the pt and discussed the tx plan with the House Staff. I agree with the exam and plan as documented in the surgery resident's note from today which I edited as indicated.  No pain - required no pain meds last 24 hrs.  Abdomen soft, NT, ND.  LFTs slowly downtrending.  Awaiting MRCP reading.   Anette Riley MD

## 2020-11-18 ENCOUNTER — APPOINTMENT (OUTPATIENT)
Dept: SURGERY | Facility: CLINIC | Age: 40
End: 2020-11-18

## 2020-11-18 LAB
CULTURE RESULTS: SIGNIFICANT CHANGE UP
CULTURE RESULTS: SIGNIFICANT CHANGE UP
SPECIMEN SOURCE: SIGNIFICANT CHANGE UP
SPECIMEN SOURCE: SIGNIFICANT CHANGE UP

## 2020-11-20 ENCOUNTER — NON-APPOINTMENT (OUTPATIENT)
Age: 40
End: 2020-11-20

## 2020-11-24 ENCOUNTER — APPOINTMENT (OUTPATIENT)
Dept: INTERNAL MEDICINE | Facility: CLINIC | Age: 40
End: 2020-11-24

## 2020-11-25 ENCOUNTER — APPOINTMENT (OUTPATIENT)
Dept: SURGERY | Facility: CLINIC | Age: 40
End: 2020-11-25
Payer: COMMERCIAL

## 2020-11-25 ENCOUNTER — APPOINTMENT (OUTPATIENT)
Dept: GASTROENTEROLOGY | Facility: CLINIC | Age: 40
End: 2020-11-25
Payer: COMMERCIAL

## 2020-11-25 VITALS
SYSTOLIC BLOOD PRESSURE: 106 MMHG | WEIGHT: 188 LBS | HEIGHT: 64 IN | BODY MASS INDEX: 32.1 KG/M2 | DIASTOLIC BLOOD PRESSURE: 68 MMHG | TEMPERATURE: 97.6 F | HEART RATE: 75 BPM | OXYGEN SATURATION: 99 % | RESPIRATION RATE: 16 BRPM

## 2020-11-25 VITALS
DIASTOLIC BLOOD PRESSURE: 64 MMHG | RESPIRATION RATE: 16 BRPM | WEIGHT: 188 LBS | HEIGHT: 64 IN | TEMPERATURE: 97.5 F | OXYGEN SATURATION: 98 % | BODY MASS INDEX: 32.1 KG/M2 | HEART RATE: 88 BPM | SYSTOLIC BLOOD PRESSURE: 102 MMHG

## 2020-11-25 DIAGNOSIS — K80.20 CALCULUS OF GALLBLADDER W/OUT CHOLECYSTITIS W/OUT OBSTRUCTION: ICD-10-CM

## 2020-11-25 DIAGNOSIS — Z09 ENCOUNTER FOR FOLLOW-UP EXAMINATION AFTER COMPLETED TREATMENT FOR CONDITIONS OTHER THAN MALIGNANT NEOPLASM: ICD-10-CM

## 2020-11-25 DIAGNOSIS — R11.0 NAUSEA: ICD-10-CM

## 2020-11-25 DIAGNOSIS — Z90.49 ACQUIRED ABSENCE OF OTHER SPECIFIED PARTS OF DIGESTIVE TRACT: ICD-10-CM

## 2020-11-25 PROCEDURE — 99024 POSTOP FOLLOW-UP VISIT: CPT

## 2020-11-25 PROCEDURE — 99213 OFFICE O/P EST LOW 20 MIN: CPT

## 2020-11-25 RX ORDER — FAMOTIDINE 40 MG/1
40 TABLET, FILM COATED ORAL
Qty: 30 | Refills: 5 | Status: DISCONTINUED | COMMUNITY
Start: 2020-10-05 | End: 2020-11-25

## 2020-11-25 NOTE — ASSESSMENT
[FreeTextEntry1] : This is a 40-year-old female status post ERCP for CBD stones and laparoscopic cholecystectomy.  She is feeling better.  She has chronic nausea.  EGD with ERCP showing retained food in her stomach.  Recommend gastric emptying study.  I recommend continuing on omeprazole 20 mg daily.  I recommend eating small meals.  She will have repeat blood work as ordered by surgery for follow-up of liver enzymes and lipase.  She is to call me if she has questions or concerns.

## 2020-11-25 NOTE — PHYSICAL EXAM
[General Appearance - Alert] : alert [General Appearance - In No Acute Distress] : in no acute distress [Sclera] : the sclera and conjunctiva were normal [Outer Ear] : the ears and nose were normal in appearance [Auscultation Breath Sounds / Voice Sounds] : lungs were clear to auscultation bilaterally [Heart Rate And Rhythm] : heart rate was normal and rhythm regular [Heart Sounds] : normal S1 and S2 [Heart Sounds Gallop] : no gallops [Murmurs] : no murmurs [Heart Sounds Pericardial Friction Rub] : no pericardial rub [Edema] : there was no peripheral edema [Bowel Sounds] : normal bowel sounds [Abdomen Soft] : soft [] : no hepato-splenomegaly [Abdomen Mass (___ Cm)] : no abdominal mass palpated [Diffuse] : diffusely [Skin Color & Pigmentation] : normal skin color and pigmentation [No Focal Deficits] : no focal deficits

## 2020-11-25 NOTE — HISTORY OF PRESENT ILLNESS
[FreeTextEntry1] : Gaby presents for follow-up visit.  She underwent a cholecystectomy in November 14.  She was having abdominal pain, nausea or vomiting.  Prior to the cholecystectomy she underwent an ERCP by Dr. Matute on November 12 for choledocholithiasis requiring sphincterotomy and balloon sweeping for removal of CBD stones.  Follow-up MRCP without evidence of choledocholithiasis.  She reports to no longer have abdominal pain but has chronic nausea.  EGD performed during the ERCP showing large amount of retained food in her stomach.  I recommend gastric emptying study to rule out gastroparesis.  As far as diarrhea, currently it has resolved.

## 2020-11-27 LAB — SURGICAL PATHOLOGY STUDY: SIGNIFICANT CHANGE UP

## 2020-11-30 PROBLEM — Z09 POSTOPERATIVE EXAMINATION: Status: ACTIVE | Noted: 2020-11-25

## 2020-11-30 LAB
ALBUMIN SERPL ELPH-MCNC: 4 G/DL
ALP BLD-CCNC: 126 U/L
ALT SERPL-CCNC: 88 U/L
AMYLASE/CREAT SERPL: 57 U/L
ANION GAP SERPL CALC-SCNC: 9 MMOL/L
AST SERPL-CCNC: 27 U/L
BASOPHILS # BLD AUTO: 0.04 K/UL
BASOPHILS NFR BLD AUTO: 0.6 %
BILIRUB SERPL-MCNC: 0.2 MG/DL
BUN SERPL-MCNC: 9 MG/DL
CALCIUM SERPL-MCNC: 9.3 MG/DL
CHLORIDE SERPL-SCNC: 104 MMOL/L
CO2 SERPL-SCNC: 25 MMOL/L
CREAT SERPL-MCNC: 0.59 MG/DL
EOSINOPHIL # BLD AUTO: 0.23 K/UL
EOSINOPHIL NFR BLD AUTO: 3.3 %
GLUCOSE SERPL-MCNC: 87 MG/DL
HCT VFR BLD CALC: 36.9 %
HGB BLD-MCNC: 11.6 G/DL
IMM GRANULOCYTES NFR BLD AUTO: 0.3 %
LPL SERPL-CCNC: 23 U/L
LYMPHOCYTES # BLD AUTO: 3.14 K/UL
LYMPHOCYTES NFR BLD AUTO: 45 %
MAN DIFF?: NORMAL
MCHC RBC-ENTMCNC: 27.8 PG
MCHC RBC-ENTMCNC: 31.4 GM/DL
MCV RBC AUTO: 88.3 FL
MONOCYTES # BLD AUTO: 0.48 K/UL
MONOCYTES NFR BLD AUTO: 6.9 %
NEUTROPHILS # BLD AUTO: 3.07 K/UL
NEUTROPHILS NFR BLD AUTO: 43.9 %
PLATELET # BLD AUTO: 327 K/UL
POTASSIUM SERPL-SCNC: 4.1 MMOL/L
PROT SERPL-MCNC: 7.4 G/DL
RBC # BLD: 4.18 M/UL
RBC # FLD: 14.6 %
SODIUM SERPL-SCNC: 138 MMOL/L
WBC # FLD AUTO: 6.98 K/UL

## 2020-12-10 NOTE — ASSESSMENT
[FreeTextEntry1] : Doing well postop.\par Path not back yet.\par Check f/u LFTs.  \par Instructions for diet, activity, wound care given, all questions answered.\par I will call the pt with the results of her LFTs. \par RTO as needed.\par \par

## 2020-12-10 NOTE — HISTORY OF PRESENT ILLNESS
[de-identified] : Gaby is a 41 y/o female here for postop visit.\par The pt was admitted emergently with cholelithiasis and choledocholithiasis. She underwent ERCP on 11/12 with sphincterotomy and stone removal. She then underwent uneventful lap anna. Postoperatively her LFTs lingered elevated and she had an MRCP from 11/17-  Status post cholecystectomy with expected postoperative changes including trace fluid in the operative bed. No well-defined collection. No biliary ductal dilatation or choledocholithiasis.\par Patient feeling well today. Denies abdominal pain, nausea vomiting. Tolerating normal diet. Denies incisional erythema, drainage. Having formed BMs every 1-2 days.

## 2020-12-10 NOTE — CONSULT LETTER
[Dear  ___] : Dear ~JANET, [Courtesy Letter:] : I had the pleasure of seeing your patient, [unfilled], in my office today. [Please see my note below.] : Please see my note below. [Sincerely,] : Sincerely, [FreeTextEntry2] : SERIGO Burton  [FreeTextEntry3] : Anette Riley M.D., F.A.C.S., F.BERTIN.S.C.R.S.\par Assistant Professor of Surgery\par [employer] [facility]\par \par

## 2020-12-14 ENCOUNTER — APPOINTMENT (OUTPATIENT)
Dept: SURGERY | Facility: HOSPITAL | Age: 40
End: 2020-12-14

## 2021-01-04 ENCOUNTER — APPOINTMENT (OUTPATIENT)
Dept: OBGYN | Facility: CLINIC | Age: 41
End: 2021-01-04

## 2021-11-30 DIAGNOSIS — K64.9 UNSPECIFIED HEMORRHOIDS: ICD-10-CM

## 2022-01-21 ENCOUNTER — NON-APPOINTMENT (OUTPATIENT)
Age: 42
End: 2022-01-21

## 2022-01-24 ENCOUNTER — APPOINTMENT (OUTPATIENT)
Dept: INTERNAL MEDICINE | Facility: CLINIC | Age: 42
End: 2022-01-24
Payer: COMMERCIAL

## 2022-01-24 VITALS
HEIGHT: 60 IN | SYSTOLIC BLOOD PRESSURE: 120 MMHG | OXYGEN SATURATION: 98 % | DIASTOLIC BLOOD PRESSURE: 60 MMHG | HEART RATE: 78 BPM

## 2022-01-24 DIAGNOSIS — T14.8XXA OTHER INJURY OF UNSPECIFIED BODY REGION, INITIAL ENCOUNTER: ICD-10-CM

## 2022-01-24 PROCEDURE — 99213 OFFICE O/P EST LOW 20 MIN: CPT

## 2022-01-29 PROBLEM — T14.8XXA SKIN ABRASION: Status: ACTIVE | Noted: 2022-01-24

## 2022-01-29 NOTE — PHYSICAL EXAM
[Normal Outer Ear/Nose] : the outer ears and nose were normal in appearance [Normal Oropharynx] : the oropharynx was normal [de-identified] : healed pimple on skin, not inflamed [de-identified] : several periauricular nodes  found under left earlobe.

## 2022-01-29 NOTE — HISTORY OF PRESENT ILLNESS
[FreeTextEntry8] : NOtes swelling and painful of localized  lymph nodes under left earlobe x 2 weeks, Still present now but no longer painful.\par She denies sore throat, nasal congestion, dental infection, fever , chills.\par Did have pimple on her chin over week ago that got inflamed after she squeezed the pimple and inflamed surrounding skin for several day and only in past 24 hours , did it resolve.

## 2022-03-18 ENCOUNTER — APPOINTMENT (OUTPATIENT)
Dept: BARIATRICS/WEIGHT MGMT | Facility: CLINIC | Age: 42
End: 2022-03-18

## 2022-03-21 ENCOUNTER — APPOINTMENT (OUTPATIENT)
Dept: BARIATRICS/WEIGHT MGMT | Facility: CLINIC | Age: 42
End: 2022-03-21
Payer: COMMERCIAL

## 2022-03-21 VITALS — BODY MASS INDEX: 35.85 KG/M2 | HEIGHT: 64 IN | WEIGHT: 210 LBS

## 2022-03-21 PROCEDURE — 99205 OFFICE O/P NEW HI 60 MIN: CPT | Mod: 95

## 2022-03-21 RX ORDER — MUPIROCIN 20 MG/G
2 OINTMENT TOPICAL 3 TIMES DAILY
Qty: 2 | Refills: 2 | Status: DISCONTINUED | COMMUNITY
Start: 2022-01-24 | End: 2022-03-21

## 2022-03-21 RX ORDER — OMEPRAZOLE 20 MG/1
20 CAPSULE, DELAYED RELEASE ORAL DAILY
Qty: 30 | Refills: 5 | Status: DISCONTINUED | COMMUNITY
Start: 2020-02-19 | End: 2022-03-21

## 2022-03-21 RX ORDER — HYDROCORTISONE 25 MG/G
2.5 CREAM TOPICAL
Qty: 1 | Refills: 0 | Status: DISCONTINUED | COMMUNITY
Start: 2021-11-30 | End: 2022-03-21

## 2022-03-21 NOTE — REASON FOR VISIT
[Initial Consultation] : an initial consultation for [Obesity] : obesity [FreeTextEntry2] : here for help with wt management

## 2022-03-21 NOTE — ASSESSMENT
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 eat dinner prior to 8 pm \par 2 plan meals and pack and bring to work\par 3 exercise : walk from the parking lot: 150 /week cardio and 60 min /week of wt or strengths training \par 4 labs \par 5 no juice, no ginger ale \par 6 eat and try new vegetables, more plant eg rice and bean s " whole " non processed food\par 7 log /journal foods\par 8 consider wegovy no fm history\par 9 needs an inpatient appointment \par f/u on 4/22 at 9 am \par 60 min \par

## 2022-03-21 NOTE — HISTORY OF PRESENT ILLNESS
[Improved Health] : Improved health [Quality of Life] : Quality of life [Improved Mobility] : Improved mobility [Improved Looks/Aesthetics] : Improved looks/aesthetics [Improve Mood] : Improved mood [Improve Life Expectancy] : Improve life expectancy [Young Adult] : yound adult [Cut/Track Calories] : cut/track calories [Exercise: ____] : exercise: [unfilled] [Prescription Medications: _____] : prescription medications: [unfilled] [Poor eating habits] : poor eating habits [Family/friends] : family/friends [Cravings] : cravings [Portions/overeating] : portions/overeating [Work stress] : work stress [I usually sleep 4-6 hours] : I usually sleep 4-6 hours [I snore] : I snore [Lunch] : lunch [Dinner] : dinner [Throughout the day] : throughout the day [Crunchy] : crunchy [Salty] : salty [Less than 1] : Vegetables: less than 1 [1-2] : Sweets: 1-2 [1] : How many cups of juice do you drink per day: 1 [6] : How many cups of water do you regularly drink per day: 6 [Spouse/partner] : spouse/partner [Child] : child [Self] : self [Overlarge portions] : overlarge portions [Frequent Snacks] : frequent snacks [Eat Fast] : eat fast [Eat Past Satisfaction] : eat past satisfaction [Skip Meals] : skip meals [Don't Chew Well] : don't chew well [Boredom Eating] : boredom eating [1 mile] : Walking distance capability:1 mile [Walking] : walking [Other: ___] : [unfilled] [None] : none [0] : 0 [Phentermine] : phentermine [FreeTextEntry2] : 165 [FreeTextEntry3] : 210 [] : No [FreeTextEntry1] : 41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 Was on phentermine 3 years ago and lost about 25 lbs and kept iit off for about a year and then crept back up. \par \par Breakfast \par none\par \par Lunch: \par work: broccoli cheddar soup from the hospital and chips\par \par snack\par \par Dinner at home 9 pm \par fried goat , with nothing\par or \par rice and beans with meat any : chicken , beef , goat,  if it comes with a salad and it is take out\par or \par if she is cooking: \par spaghetti with a meat sauce: jar \par dessert none \par \par snack \par none \par \par on days off 3-4 day a week works 12 hour shifts\par \par breakfast: \par \par lunch \par none \par \par dinner: \par Uber eats: Finnish, or hatian food or ramen noodles \par \par doesn’t do much  cooking but knows how \par

## 2022-03-28 NOTE — ED PROVIDER NOTE - CHIEF COMPLAINT
"  Assessment & Plan     Vaginal lump  This looks like an ingrown hair.    Asked her to do warm packs a few times per day.    Let me know if not resolving in the next 2 weeks, will have her see ob/gyn.          BMI:   Estimated body mass index is 31.66 kg/m  as calculated from the following:    Height as of 3/8/22: 1.619 m (5' 3.75\").    Weight as of this encounter: 83 kg (183 lb).       Return in about 2 weeks (around 4/11/2022) for If failure to improve.    Nicole Soto, [facility]    Gael Ugalde is a 35 year old who presents for the following health issues     Vaginal Problem     History of Present Illness       Reason for visit:  Lump on labia  Symptoms include:  Lump  Symptom intensity:  Mild  Symptom progression:  Staying the same  Had these symptoms before:  No  What makes it worse:  No  What makes it better:  No    She eats 2-3 servings of fruits and vegetables daily.She consumes 1 sweetened beverage(s) daily.She exercises with enough effort to increase her heart rate 9 or less minutes per day.  She exercises with enough effort to increase her heart rate 3 or less days per week.   She is taking medications regularly.       Found a vaginal bump while showering about a week ago.  Right labia.  Painless.  Was on vacation in [place].  She is sure bump is new in the last week.    Denies any other symptoms such as pelvic pain, new vaginal discharge, odor or fever.       Review of Systems   Genitourinary: Positive for vaginal discharge.      Constitutional, HEENT, cardiovascular, pulmonary, gi and gu systems are negative, except as otherwise noted.      Objective    /87   Pulse 83   Temp 98.3  F (36.8  C)   Wt 83 kg (183 lb)   SpO2 98%   BMI 31.66 kg/m    Body mass index is 31.66 kg/m .  Physical Exam   GENERAL: healthy, alert and no distress  EYES: Eyes grossly normal to inspection, PERRL and conjunctivae and sclerae normal  RESP: lungs clear to auscultation - no " The patient is a 40y Female complaining of abdominal pain. rales, rhonchi or wheezes   (female): normal female external genitalia, normal urethral meatus.  Small smooth pink papule to right labia majora, firm, shallow. No fluctuance or induration.  No opening, ulceration or drainage.     MS: no gross musculoskeletal defects noted, no edema  SKIN: no suspicious lesions or rashes  NEURO: Normal strength and tone, mentation intact and speech normal  PSYCH: mentation appears normal, affect normal/bright

## 2022-03-29 ENCOUNTER — TRANSCRIPTION ENCOUNTER (OUTPATIENT)
Age: 42
End: 2022-03-29

## 2022-04-13 NOTE — ED ADULT NURSE NOTE - PAIN RATING/NUMBER SCALE (0-10): ACTIVITY
2 Glycopyrrolate Counseling:  I discussed with the patient the risks of glycopyrrolate including but not limited to skin rash, drowsiness, dry mouth, difficulty urinating, and blurred vision.

## 2022-04-22 ENCOUNTER — APPOINTMENT (OUTPATIENT)
Dept: BARIATRICS/WEIGHT MGMT | Facility: CLINIC | Age: 42
End: 2022-04-22

## 2022-08-16 NOTE — PROGRESS NOTE ADULT - SUBJECTIVE AND OBJECTIVE BOX
TEAM [ [facility] ] Surgery Daily Progress Note  =====================================================    SUBJECTIVE: Patient seen and examined at bedside on AM rounds. Patient reports that they're feeling well. Tolerating LFD, denies nausea, vomiting.   +Flatus/    +BM. OOB/Ambulating as tolerated. Denies fever, chills.     24 HOUR EVENTS:]  Obtained MRCP after uptrending LFTs    MEDICATIONS  (STANDING):  acetaminophen   Tablet .. 650 milliGRAM(s) Oral every 6 hours  enoxaparin Injectable 40 milliGRAM(s) SubCutaneous daily  famotidine    Tablet 20 milliGRAM(s) Oral daily  pantoprazole    Tablet 40 milliGRAM(s) Oral before breakfast  polyethylene glycol 3350 17 Gram(s) Oral daily  senna 2 Tablet(s) Oral at bedtime    MEDICATIONS  (PRN):  oxyCODONE    IR 5 milliGRAM(s) Oral every 4 hours PRN Severe Pain (7 - 10)      OBJECTIVE:    Vital Signs Last 24 Hrs  T(C): 36.6 (16 Nov 2020 21:21), Max: 36.9 (16 Nov 2020 05:27)  T(F): 97.9 (16 Nov 2020 21:21), Max: 98.4 (16 Nov 2020 05:27)  HR: 98 (16 Nov 2020 21:21) (76 - 98)  BP: 124/88 (16 Nov 2020 21:21) (105/73 - 124/88)  BP(mean): --  RR: 18 (16 Nov 2020 21:21) (18 - 18)  SpO2: 98% (16 Nov 2020 21:21) (97% - 98%)        I&O's Detail    16 Nov 2020 07:01  -  17 Nov 2020 02:38  --------------------------------------------------------  IN:    Oral Fluid: 120 mL  Total IN: 120 mL    OUT:  Total OUT: 0 mL    Total NET: 120 mL          Daily     Daily     LABS:                        11.1   7.34  )-----------( 226      ( 16 Nov 2020 07:08 )             33.6     11-16    139  |  104  |  12  ----------------------------<  94  3.7   |  24  |  0.62    Ca    9.3      16 Nov 2020 07:04  Phos  3.8     11-16  Mg     2.2     11-16    TPro  6.8  /  Alb  3.5  /  TBili  0.5  /  DBili  x   /  AST  266<H>  /  ALT  478<H>  /  AlkPhos  157<H>  11-16                  Physical Exam:  GEN: resting in bed comfortably in NAD  RESP: no increased WOB  ABD: soft, non-distended, RUQ tenderness without rebound tenderness or guarding  EXTR: warm, well-perfused without gross deformities; spontaneous movement in b/l U/L extrem  NEURO: AAOx4         forehead

## 2022-11-07 NOTE — ED PROVIDER NOTE - NS_BEDUNITTYPES_ED_ALL_ED
MEDICINE Complex Repair And Modified Advancement Flap Text: The defect edges were debeveled with a #15 scalpel blade.  The primary defect was closed partially with a complex linear closure.  Given the location of the remaining defect, shape of the defect and the proximity to free margins a modified advancement flap was deemed most appropriate for complete closure of the defect.  Using a sterile surgical marker, an appropriate advancement flap was drawn incorporating the defect and placing the expected incisions within the relaxed skin tension lines where possible.    The area thus outlined was incised deep to adipose tissue with a #15 scalpel blade.  The skin margins were undermined to an appropriate distance in all directions utilizing iris scissors.

## 2022-11-23 NOTE — ED PROVIDER NOTE - ATTENDING CONTRIBUTION TO CARE
minimal Patient with abdomen pain. Moderate. points to epigastrum. History of similar pain in the past with history of gastritis.  will get iv, labs, ct a/p, Will follow up on labs, analgesia, reassess and disposition as clinically indicated.   Patient  understands anticipatory guidance and was given strict return and follow up precautions. The patient has been informed of all concerning signs and symptoms to return to Emergency Department, the necessity to follow up with PMD/Clinic/follow up provided within 2-3 days was explained, and the patient reports understanding of above with capacity and insight if patient disposition is to be home.

## 2022-12-24 NOTE — ED PROVIDER NOTE - CONDITION AT DISCHARGE:
87yo F with PMH of HTN, Dyslipidemia, MR s/p Viola, KIMBER. Fib (off Eliquis 5 weeks ago for LGIB), Diastolic CHF, Chronic Hypoxic Respiratory Failure (on 3.5L NC), hx of COVID-19, CKD stage 3, Anemia, Anxiety, Depression brought in by ambulance from [facility] [place]    flu  malaise  cough  htn  ckd  anemia  depression  HFpEF  hypoxemia  valv heart disease  pleural effusions    on torsemide  on tamiflu  vs noted  labs reviewed  imaging reviewed  cm follow up noted    ID and Cardio eval noted  on isolation for the FLU  tamiflu  acap - cough rx regimen PRN  on nebs prn  oral and skin care  cvs rx regimen  BP control  monitor VS and Sat  on AC for AF - rate and rhythm control  assist with needs  GOC - documented - DNR   Improved

## 2023-02-23 ENCOUNTER — NON-APPOINTMENT (OUTPATIENT)
Age: 43
End: 2023-02-23

## 2023-08-31 NOTE — BRIEF OPERATIVE NOTE - IV INFUSIONS - CRYSTALLOIDS
Chief Complaint:   Asuncion Guzman is a 69 y.o. female who presents for   Chief Complaint   Patient presents with    Follow-up        HPI    Patient presents to the office for follow up.     69 y.o. female with hx of CAD, HTN, hyperlipidemia, COPD, RLS , PAD comes for recent hospital dc follow up      Multiple admissions recently sec to her CAD see below      CAD with hx of multiple stents , s.p recent CABG in 5/23 with new stenting of graft in 7/23   Pt also  Rt  Distal SFA to anterior tibial bypass in 6/23 in between cardiac procedures   Reports she has gone through a lot and trying to recover slowly   Currently denies any chest pain but has significant physical deconditioning and planning to start cardiac rehab. Some exertional dyspnea and pedal edema but attributes to inactivity for last 3 months  No palpitations. Compliant with meds and diuretics but not compliant to diet and water intake   Taking ASA, plavix , low dose BB, entresto   Her chest is still hurting. Still taking Oxycodone. It is hard for her to lay down and sleep at night. It is so sore. Feeling like wires are sticking. Still having shooting pains in her legs. Started rehab. Reports ongoing arthritic pains and oxycodone given post op helping and wishes to continue      She continues to smoke but has cut down since last 3 months with cardiac issues     Hx of COPD  KAMIIN pulmonary nodule  -hasn't started Stiolto- using IBD prn   Sees Dr. Nguyễn Mckeon     RLS  -on Mirapex        Hyperlipidemia   -cannot tolerate statins- but now on lipitor 40 mg with continued myalgias. She stopped it. Lives alone, daughter helps  No depression issues  No recent falls         Review of Systems  Review of Systems   Constitutional:  Positive for fatigue. Respiratory:  Positive for shortness of breath. Cardiovascular:  Positive for chest pain and leg swelling.        Allergies  Ibuprofen, Niacin and related, Statins, and Zetia [ezetimibe]      Vitals  BP 800ri

## 2023-10-27 NOTE — ED ADULT TRIAGE NOTE - HEIGHT IN CM
He is tolerating 50 mg Toprol.  He will keep this dose since he is asymptomatic from a bradycardia standpoint and his blood pressures are in good range at this time.   162.56

## 2023-11-17 ENCOUNTER — OUTPATIENT (OUTPATIENT)
Dept: OUTPATIENT SERVICES | Facility: HOSPITAL | Age: 43
LOS: 1 days | End: 2023-11-17
Payer: COMMERCIAL

## 2023-11-17 ENCOUNTER — APPOINTMENT (OUTPATIENT)
Dept: BARIATRICS/WEIGHT MGMT | Facility: CLINIC | Age: 43
End: 2023-11-17
Payer: COMMERCIAL

## 2023-11-17 DIAGNOSIS — Z98.890 OTHER SPECIFIED POSTPROCEDURAL STATES: Chronic | ICD-10-CM

## 2023-11-17 DIAGNOSIS — Z30.2 ENCOUNTER FOR STERILIZATION: Chronic | ICD-10-CM

## 2023-11-17 DIAGNOSIS — I10 ESSENTIAL (PRIMARY) HYPERTENSION: ICD-10-CM

## 2023-11-17 DIAGNOSIS — D17.1 BENIGN LIPOMATOUS NEOPLASM OF SKIN AND SUBCUTANEOUS TISSUE OF TRUNK: Chronic | ICD-10-CM

## 2023-11-17 DIAGNOSIS — Z90.13 ACQUIRED ABSENCE OF BILATERAL BREASTS AND NIPPLES: Chronic | ICD-10-CM

## 2023-11-17 PROCEDURE — G0463: CPT

## 2023-11-17 PROCEDURE — 99214 OFFICE O/P EST MOD 30 MIN: CPT | Mod: 95

## 2023-11-20 LAB
25(OH)D3 SERPL-MCNC: 16.5 NG/ML
ALBUMIN SERPL ELPH-MCNC: 4.1 G/DL
ALP BLD-CCNC: 87 U/L
ALT SERPL-CCNC: 12 U/L
ANION GAP SERPL CALC-SCNC: 13 MMOL/L
AST SERPL-CCNC: 12 U/L
BILIRUB SERPL-MCNC: 0.2 MG/DL
BUN SERPL-MCNC: 10 MG/DL
CALCIUM SERPL-MCNC: 9.4 MG/DL
CHLORIDE SERPL-SCNC: 104 MMOL/L
CHOLEST SERPL-MCNC: 210 MG/DL
CO2 SERPL-SCNC: 25 MMOL/L
CREAT SERPL-MCNC: 0.69 MG/DL
CRP SERPL HS-MCNC: 6.55 MG/L
EGFR: 109 ML/MIN/1.73M2
GLUCOSE SERPL-MCNC: 93 MG/DL
HDLC SERPL-MCNC: 48 MG/DL
INSULIN SERPL-MCNC: 12.5 UU/ML
LDLC SERPL CALC-MCNC: 149 MG/DL
NONHDLC SERPL-MCNC: 162 MG/DL
POTASSIUM SERPL-SCNC: 4 MMOL/L
PROT SERPL-MCNC: 7.4 G/DL
SODIUM SERPL-SCNC: 142 MMOL/L
T3FREE SERPL-MCNC: 2.87 PG/ML
T4 FREE SERPL-MCNC: 0.9 NG/DL
TRIGL SERPL-MCNC: 74 MG/DL
TSH SERPL-ACNC: 2.99 UIU/ML

## 2023-11-21 LAB
ESTIMATED AVERAGE GLUCOSE: 108 MG/DL
HBA1C MFR BLD HPLC: 5.4 %

## 2023-11-21 NOTE — ED PROVIDER NOTE - ST/T WAVE
Patient prepared for procedure.   
Pt discharged home, awake, alert, oriented x's 4,  denies any pain, no apparent distress noted. All questions and concerns addressed and answered, pt verbalizes understanding of discharge process, pt meets discharge criteria and is being discharged to car via wheelchair.     
No st elevations or depressions.  Grossly normal t wave morphology.

## 2023-11-27 DIAGNOSIS — E66.9 OBESITY, UNSPECIFIED: ICD-10-CM

## 2024-01-05 ENCOUNTER — OUTPATIENT (OUTPATIENT)
Dept: OUTPATIENT SERVICES | Facility: HOSPITAL | Age: 44
LOS: 1 days | End: 2024-01-05
Payer: COMMERCIAL

## 2024-01-05 ENCOUNTER — APPOINTMENT (OUTPATIENT)
Dept: BARIATRICS/WEIGHT MGMT | Facility: CLINIC | Age: 44
End: 2024-01-05
Payer: COMMERCIAL

## 2024-01-05 DIAGNOSIS — E66.9 OBESITY, UNSPECIFIED: ICD-10-CM

## 2024-01-05 DIAGNOSIS — Z98.890 OTHER SPECIFIED POSTPROCEDURAL STATES: Chronic | ICD-10-CM

## 2024-01-05 DIAGNOSIS — Z30.2 ENCOUNTER FOR STERILIZATION: Chronic | ICD-10-CM

## 2024-01-05 DIAGNOSIS — D17.1 BENIGN LIPOMATOUS NEOPLASM OF SKIN AND SUBCUTANEOUS TISSUE OF TRUNK: Chronic | ICD-10-CM

## 2024-01-05 DIAGNOSIS — I10 ESSENTIAL (PRIMARY) HYPERTENSION: ICD-10-CM

## 2024-01-05 DIAGNOSIS — Z90.13 ACQUIRED ABSENCE OF BILATERAL BREASTS AND NIPPLES: Chronic | ICD-10-CM

## 2024-01-05 PROCEDURE — G0463: CPT

## 2024-01-05 PROCEDURE — 99213 OFFICE O/P EST LOW 20 MIN: CPT | Mod: 95

## 2024-01-05 RX ORDER — TIRZEPATIDE 2.5 MG/.5ML
2.5 INJECTION, SOLUTION SUBCUTANEOUS
Qty: 4 | Refills: 5 | Status: ACTIVE | COMMUNITY
Start: 2024-01-05 | End: 1900-01-01

## 2024-01-05 NOTE — HISTORY OF PRESENT ILLNESS
[FreeTextEntry1] : has made some lifestyle changes since initial appointment thinks she might have lost some weight since last visit 210 lbs hieght = 5'4  eating more fiber, more physically active would still like to consider medications  otherwise without new complaints today.

## 2024-01-05 NOTE — ASSESSMENT
[FreeTextEntry1] : BARIATRIC SURGERY HISTORY: none   OBESITY COMORBIDITIES: none  ANTI-OBESITY MEDICATIONS: previously phentermine  OBESITY MEDICATION SIDE EFFECTS: none  Recommend the following: cont with increased physical activity cont migrating towards high fiber, plant-predominant eating trial of tirzepatide 2.5 mg   rtc in 4-6 weeks.

## 2024-01-05 NOTE — REASON FOR VISIT
[Home] : at home, [unfilled] , at the time of the visit. [Other Location: e.g. Home (Enter Location, City,State)___] : at [unfilled] [Other:____] : [unfilled] [Patient] : the patient [Follow-Up] : a follow-up visit [FreeTextEntry1] : obesity

## 2024-02-12 ENCOUNTER — TRANSCRIPTION ENCOUNTER (OUTPATIENT)
Age: 44
End: 2024-02-12

## 2024-02-16 ENCOUNTER — APPOINTMENT (OUTPATIENT)
Dept: BARIATRICS/WEIGHT MGMT | Facility: CLINIC | Age: 44
End: 2024-02-16
Payer: COMMERCIAL

## 2024-02-16 ENCOUNTER — OUTPATIENT (OUTPATIENT)
Dept: OUTPATIENT SERVICES | Facility: HOSPITAL | Age: 44
LOS: 1 days | End: 2024-02-16

## 2024-02-16 ENCOUNTER — APPOINTMENT (OUTPATIENT)
Dept: INTERNAL MEDICINE | Facility: CLINIC | Age: 44
End: 2024-02-16

## 2024-02-16 ENCOUNTER — OUTPATIENT (OUTPATIENT)
Dept: OUTPATIENT SERVICES | Facility: HOSPITAL | Age: 44
LOS: 1 days | End: 2024-02-16
Payer: COMMERCIAL

## 2024-02-16 ENCOUNTER — NON-APPOINTMENT (OUTPATIENT)
Age: 44
End: 2024-02-16

## 2024-02-16 VITALS — BODY MASS INDEX: 35 KG/M2 | HEIGHT: 64 IN | WEIGHT: 205 LBS

## 2024-02-16 DIAGNOSIS — Z90.13 ACQUIRED ABSENCE OF BILATERAL BREASTS AND NIPPLES: Chronic | ICD-10-CM

## 2024-02-16 DIAGNOSIS — Z30.2 ENCOUNTER FOR STERILIZATION: Chronic | ICD-10-CM

## 2024-02-16 DIAGNOSIS — D17.1 BENIGN LIPOMATOUS NEOPLASM OF SKIN AND SUBCUTANEOUS TISSUE OF TRUNK: Chronic | ICD-10-CM

## 2024-02-16 DIAGNOSIS — Z98.890 OTHER SPECIFIED POSTPROCEDURAL STATES: Chronic | ICD-10-CM

## 2024-02-16 DIAGNOSIS — I10 ESSENTIAL (PRIMARY) HYPERTENSION: ICD-10-CM

## 2024-02-16 PROCEDURE — 99213 OFFICE O/P EST LOW 20 MIN: CPT | Mod: 95

## 2024-02-16 PROCEDURE — G0463: CPT

## 2024-02-16 NOTE — HISTORY OF PRESENT ILLNESS
[FreeTextEntry1] : has sustained  some lifestyle changes since initial appointment   205  lbs (5 lbs decrease from the last visit) hieght = 5'4  eating more fiber, more physically active still waiting on PA clearance for zepbound  otherwise without new complaints today.

## 2024-02-28 DIAGNOSIS — E66.9 OBESITY, UNSPECIFIED: ICD-10-CM

## 2024-04-05 ENCOUNTER — APPOINTMENT (OUTPATIENT)
Dept: BARIATRICS/WEIGHT MGMT | Facility: CLINIC | Age: 44
End: 2024-04-05
Payer: COMMERCIAL

## 2024-04-05 ENCOUNTER — OUTPATIENT (OUTPATIENT)
Dept: OUTPATIENT SERVICES | Facility: HOSPITAL | Age: 44
LOS: 1 days | End: 2024-04-05
Payer: SELF-PAY

## 2024-04-05 DIAGNOSIS — Z98.890 OTHER SPECIFIED POSTPROCEDURAL STATES: Chronic | ICD-10-CM

## 2024-04-05 DIAGNOSIS — Z90.13 ACQUIRED ABSENCE OF BILATERAL BREASTS AND NIPPLES: Chronic | ICD-10-CM

## 2024-04-05 DIAGNOSIS — D17.1 BENIGN LIPOMATOUS NEOPLASM OF SKIN AND SUBCUTANEOUS TISSUE OF TRUNK: Chronic | ICD-10-CM

## 2024-04-05 DIAGNOSIS — Z30.2 ENCOUNTER FOR STERILIZATION: Chronic | ICD-10-CM

## 2024-04-05 DIAGNOSIS — I10 ESSENTIAL (PRIMARY) HYPERTENSION: ICD-10-CM

## 2024-04-05 PROCEDURE — G0463: CPT

## 2024-04-05 PROCEDURE — 99213 OFFICE O/P EST LOW 20 MIN: CPT | Mod: 95

## 2024-04-06 NOTE — ASSESSMENT
[FreeTextEntry1] : BARIATRIC SURGERY HISTORY: none   OBESITY COMORBIDITIES: none  ANTI-OBESITY MEDICATIONS: previously phentermine, tirzepatide but then shortages  OBESITY MEDICATION SIDE EFFECTS: none  Recommend the following: cont with increased physical activity cont migrating towards high fiber, plant-predominant eating given dearth of comparable alternatives will wait until tirzepatide available and will restart 2.5mg at that time rtc in 4-6 weeks.

## 2024-04-06 NOTE — HISTORY OF PRESENT ILLNESS
[FreeTextEntry1] : has sustained  some lifestyle changes since initial appointment   197.5  lbs (7.5 lb decrease from the last visit) - 12.5 lbs overall) hieght = 5'4  eating more fiber, more physically active was able to get zepbound but now has been off again as there are shortages  otherwise without new complaints today.

## 2024-04-15 DIAGNOSIS — E66.9 OBESITY, UNSPECIFIED: ICD-10-CM

## 2024-04-23 NOTE — DISCHARGE NOTE NURSING/CASE MANAGEMENT/SOCIAL WORK - NSDPLANG ASIS_GEN_ALL_CORE
[de-identified] : 67-year-old female here for evaluation of a mass on her right middle finger.  She recently just noticed over the last couple of days.  She does report she had a mass in the same spot on her finger that which comes and goes over the last couple of years.  Denies any recent trauma or falls.  Denies any numbness or tingling.  Denies any signs of infection. No

## 2024-06-10 ENCOUNTER — TRANSCRIPTION ENCOUNTER (OUTPATIENT)
Age: 44
End: 2024-06-10

## 2024-06-14 ENCOUNTER — OUTPATIENT (OUTPATIENT)
Dept: OUTPATIENT SERVICES | Facility: HOSPITAL | Age: 44
LOS: 1 days | End: 2024-06-14
Payer: COMMERCIAL

## 2024-06-14 ENCOUNTER — APPOINTMENT (OUTPATIENT)
Dept: BARIATRICS/WEIGHT MGMT | Facility: CLINIC | Age: 44
End: 2024-06-14
Payer: COMMERCIAL

## 2024-06-14 DIAGNOSIS — D17.1 BENIGN LIPOMATOUS NEOPLASM OF SKIN AND SUBCUTANEOUS TISSUE OF TRUNK: Chronic | ICD-10-CM

## 2024-06-14 DIAGNOSIS — K59.09 OTHER CONSTIPATION: ICD-10-CM

## 2024-06-14 DIAGNOSIS — I10 ESSENTIAL (PRIMARY) HYPERTENSION: ICD-10-CM

## 2024-06-14 DIAGNOSIS — E66.9 OBESITY, UNSPECIFIED: ICD-10-CM

## 2024-06-14 DIAGNOSIS — Z98.890 OTHER SPECIFIED POSTPROCEDURAL STATES: Chronic | ICD-10-CM

## 2024-06-14 DIAGNOSIS — Z90.13 ACQUIRED ABSENCE OF BILATERAL BREASTS AND NIPPLES: Chronic | ICD-10-CM

## 2024-06-14 DIAGNOSIS — Z30.2 ENCOUNTER FOR STERILIZATION: Chronic | ICD-10-CM

## 2024-06-14 PROCEDURE — G0463: CPT

## 2024-06-14 PROCEDURE — 99213 OFFICE O/P EST LOW 20 MIN: CPT | Mod: 95

## 2024-06-14 RX ORDER — TIRZEPATIDE 10 MG/.5ML
10 INJECTION, SOLUTION SUBCUTANEOUS
Qty: 12 | Refills: 1 | Status: ACTIVE | COMMUNITY
Start: 2024-06-14 | End: 1900-01-01

## 2024-06-14 NOTE — HISTORY OF PRESENT ILLNESS
[FreeTextEntry1] : has sustained  some lifestyle changes since initial appointment   196 lbs (1.5 lb decrease from the last visit) - 12.5 lbs overall) hieght = 5'4  doesn;t have much of an appetite tends to make own smoothies - fruit with protein eating more fiber, more physically active back on zepbound 5 mg of late constipation resolved with increased fiber intake  otherwise without new complaints today.

## 2024-06-14 NOTE — ASSESSMENT
[FreeTextEntry1] : BARIATRIC SURGERY HISTORY: none   OBESITY COMORBIDITIES: none  ANTI-OBESITY MEDICATIONS: previously phentermine, tirzepatide but then shortages  OBESITY MEDICATION SIDE EFFECTS: none  Recommend the following: cont with increased physical activity cont migrating towards high fiber, plant-predominant eating - particular focus on dietary fiber cont with 5mg of zepbound, but will increase to 10mg for next dose  rtc in 6 weeks

## 2024-06-18 RX ORDER — TIRZEPATIDE 5 MG/.5ML
5 INJECTION, SOLUTION SUBCUTANEOUS
Qty: 3 | Refills: 1 | Status: ACTIVE | COMMUNITY
Start: 2024-03-12 | End: 1900-01-01

## 2024-06-24 DIAGNOSIS — E66.9 OBESITY, UNSPECIFIED: ICD-10-CM

## 2024-06-24 DIAGNOSIS — K59.09 OTHER CONSTIPATION: ICD-10-CM

## 2024-07-21 ENCOUNTER — NON-APPOINTMENT (OUTPATIENT)
Age: 44
End: 2024-07-21

## 2024-08-08 ENCOUNTER — EMERGENCY (EMERGENCY)
Facility: HOSPITAL | Age: 44
LOS: 1 days | Discharge: HOME | End: 2024-08-08
Attending: EMERGENCY MEDICINE
Payer: COMMERCIAL

## 2024-08-08 VITALS
OXYGEN SATURATION: 100 % | HEART RATE: 111 BPM | HEIGHT: 64 IN | RESPIRATION RATE: 20 BRPM | DIASTOLIC BLOOD PRESSURE: 102 MMHG | SYSTOLIC BLOOD PRESSURE: 144 MMHG | WEIGHT: 182.1 LBS | TEMPERATURE: 98 F

## 2024-08-08 VITALS
DIASTOLIC BLOOD PRESSURE: 82 MMHG | SYSTOLIC BLOOD PRESSURE: 114 MMHG | OXYGEN SATURATION: 100 % | HEART RATE: 90 BPM | RESPIRATION RATE: 20 BRPM | TEMPERATURE: 98 F

## 2024-08-08 DIAGNOSIS — Z30.2 ENCOUNTER FOR STERILIZATION: Chronic | ICD-10-CM

## 2024-08-08 DIAGNOSIS — D17.1 BENIGN LIPOMATOUS NEOPLASM OF SKIN AND SUBCUTANEOUS TISSUE OF TRUNK: Chronic | ICD-10-CM

## 2024-08-08 DIAGNOSIS — Z98.890 OTHER SPECIFIED POSTPROCEDURAL STATES: Chronic | ICD-10-CM

## 2024-08-08 DIAGNOSIS — Z90.13 ACQUIRED ABSENCE OF BILATERAL BREASTS AND NIPPLES: Chronic | ICD-10-CM

## 2024-08-08 LAB
ALBUMIN SERPL ELPH-MCNC: 4.4 G/DL — SIGNIFICANT CHANGE UP (ref 3.3–5)
ALP SERPL-CCNC: 83 U/L — SIGNIFICANT CHANGE UP (ref 40–120)
ALT FLD-CCNC: 12 U/L — SIGNIFICANT CHANGE UP (ref 10–45)
ANION GAP SERPL CALC-SCNC: 17 MMOL/L — SIGNIFICANT CHANGE UP (ref 5–17)
AST SERPL-CCNC: 15 U/L — SIGNIFICANT CHANGE UP (ref 10–40)
BASOPHILS # BLD AUTO: 0.02 K/UL — SIGNIFICANT CHANGE UP (ref 0–0.2)
BASOPHILS NFR BLD AUTO: 0.2 % — SIGNIFICANT CHANGE UP (ref 0–2)
BILIRUB SERPL-MCNC: 0.4 MG/DL — SIGNIFICANT CHANGE UP (ref 0.2–1.2)
BUN SERPL-MCNC: 11 MG/DL — SIGNIFICANT CHANGE UP (ref 7–23)
CALCIUM SERPL-MCNC: 10.1 MG/DL — SIGNIFICANT CHANGE UP (ref 8.4–10.5)
CHLORIDE SERPL-SCNC: 102 MMOL/L — SIGNIFICANT CHANGE UP (ref 96–108)
CO2 SERPL-SCNC: 23 MMOL/L — SIGNIFICANT CHANGE UP (ref 22–31)
CREAT SERPL-MCNC: 0.79 MG/DL — SIGNIFICANT CHANGE UP (ref 0.5–1.3)
EGFR: 94 ML/MIN/1.73M2 — SIGNIFICANT CHANGE UP
EGFR: 94 ML/MIN/1.73M2 — SIGNIFICANT CHANGE UP
EOSINOPHIL # BLD AUTO: 0.03 K/UL — SIGNIFICANT CHANGE UP (ref 0–0.5)
EOSINOPHIL NFR BLD AUTO: 0.3 % — SIGNIFICANT CHANGE UP (ref 0–6)
GLUCOSE SERPL-MCNC: 89 MG/DL — SIGNIFICANT CHANGE UP (ref 70–99)
HCT VFR BLD CALC: 38.8 % — SIGNIFICANT CHANGE UP (ref 34.5–45)
HGB BLD-MCNC: 12.8 G/DL — SIGNIFICANT CHANGE UP (ref 11.5–15.5)
IMM GRANULOCYTES NFR BLD AUTO: 0.3 % — SIGNIFICANT CHANGE UP (ref 0–0.9)
LIDOCAIN IGE QN: 27 U/L — SIGNIFICANT CHANGE UP (ref 7–60)
LYMPHOCYTES # BLD AUTO: 3.44 K/UL — HIGH (ref 1–3.3)
LYMPHOCYTES # BLD AUTO: 33.5 % — SIGNIFICANT CHANGE UP (ref 13–44)
MCHC RBC-ENTMCNC: 28 PG — SIGNIFICANT CHANGE UP (ref 27–34)
MCHC RBC-ENTMCNC: 33 GM/DL — SIGNIFICANT CHANGE UP (ref 32–36)
MCV RBC AUTO: 84.9 FL — SIGNIFICANT CHANGE UP (ref 80–100)
MONOCYTES # BLD AUTO: 0.5 K/UL — SIGNIFICANT CHANGE UP (ref 0–0.9)
MONOCYTES NFR BLD AUTO: 4.9 % — SIGNIFICANT CHANGE UP (ref 2–14)
NEUTROPHILS # BLD AUTO: 6.25 K/UL — SIGNIFICANT CHANGE UP (ref 1.8–7.4)
NEUTROPHILS NFR BLD AUTO: 60.8 % — SIGNIFICANT CHANGE UP (ref 43–77)
NRBC # BLD: 0 /100 WBCS — SIGNIFICANT CHANGE UP (ref 0–0)
NRBC BLD-RTO: 0 /100 WBCS — SIGNIFICANT CHANGE UP (ref 0–0)
PLATELET # BLD AUTO: 270 K/UL — SIGNIFICANT CHANGE UP (ref 150–400)
POTASSIUM SERPL-MCNC: 3.2 MMOL/L — LOW (ref 3.5–5.3)
POTASSIUM SERPL-SCNC: 3.2 MMOL/L — LOW (ref 3.5–5.3)
PROT SERPL-MCNC: 8.4 G/DL — HIGH (ref 6–8.3)
RBC # BLD: 4.57 M/UL — SIGNIFICANT CHANGE UP (ref 3.8–5.2)
RBC # FLD: 12.9 % — SIGNIFICANT CHANGE UP (ref 10.3–14.5)
SODIUM SERPL-SCNC: 142 MMOL/L — SIGNIFICANT CHANGE UP (ref 135–145)
WBC # BLD: 10.27 K/UL — SIGNIFICANT CHANGE UP (ref 3.8–10.5)
WBC # FLD AUTO: 10.27 K/UL — SIGNIFICANT CHANGE UP (ref 3.8–10.5)

## 2024-08-08 PROCEDURE — 76705 ECHO EXAM OF ABDOMEN: CPT | Mod: 26

## 2024-08-08 PROCEDURE — 96374 THER/PROPH/DIAG INJ IV PUSH: CPT | Mod: XU

## 2024-08-08 PROCEDURE — 80053 COMPREHEN METABOLIC PANEL: CPT

## 2024-08-08 PROCEDURE — 76705 ECHO EXAM OF ABDOMEN: CPT

## 2024-08-08 PROCEDURE — 71045 X-RAY EXAM CHEST 1 VIEW: CPT | Mod: 26

## 2024-08-08 PROCEDURE — 99285 EMERGENCY DEPT VISIT HI MDM: CPT | Mod: 25

## 2024-08-08 PROCEDURE — 99285 EMERGENCY DEPT VISIT HI MDM: CPT

## 2024-08-08 PROCEDURE — 71045 X-RAY EXAM CHEST 1 VIEW: CPT

## 2024-08-08 PROCEDURE — 96375 TX/PRO/DX INJ NEW DRUG ADDON: CPT

## 2024-08-08 PROCEDURE — 85025 COMPLETE CBC W/AUTO DIFF WBC: CPT

## 2024-08-08 PROCEDURE — 83690 ASSAY OF LIPASE: CPT

## 2024-08-08 RX ORDER — MAGNESIUM, ALUMINUM HYDROXIDE 200-200 MG
30 TABLET,CHEWABLE ORAL ONCE
Refills: 0 | Status: COMPLETED | OUTPATIENT
Start: 2024-08-08 | End: 2024-08-08

## 2024-08-08 RX ORDER — ONDANSETRON HCL/PF 4 MG/2 ML
4 VIAL (ML) INJECTION ONCE
Refills: 0 | Status: COMPLETED | OUTPATIENT
Start: 2024-08-08 | End: 2024-08-08

## 2024-08-08 RX ORDER — ACETAMINOPHEN 500 MG/5ML
1000 LIQUID (ML) ORAL ONCE
Refills: 0 | Status: COMPLETED | OUTPATIENT
Start: 2024-08-08 | End: 2024-08-08

## 2024-08-08 RX ADMIN — Medication 30 MILLILITER(S): at 11:14

## 2024-08-08 RX ADMIN — Medication 4 MILLIGRAM(S): at 11:14

## 2024-08-08 RX ADMIN — Medication 400 MILLIGRAM(S): at 11:14

## 2024-08-08 RX ADMIN — Medication 1000 MILLILITER(S): at 11:14

## 2024-08-08 RX ADMIN — Medication 40 MILLIEQUIVALENT(S): at 13:48

## 2024-08-08 RX ADMIN — Medication 20 MILLIGRAM(S): at 11:14

## 2024-08-19 RX ORDER — TIRZEPATIDE 12.5 MG/.5ML
12.5 INJECTION, SOLUTION SUBCUTANEOUS
Qty: 12 | Refills: 1 | Status: ACTIVE | COMMUNITY
Start: 2024-08-19 | End: 1900-01-01

## 2024-09-14 ENCOUNTER — NON-APPOINTMENT (OUTPATIENT)
Age: 44
End: 2024-09-14

## 2024-09-17 ENCOUNTER — TRANSCRIPTION ENCOUNTER (OUTPATIENT)
Age: 44
End: 2024-09-17

## 2024-09-27 ENCOUNTER — OUTPATIENT (OUTPATIENT)
Dept: OUTPATIENT SERVICES | Facility: HOSPITAL | Age: 44
LOS: 1 days | End: 2024-09-27
Payer: COMMERCIAL

## 2024-09-27 ENCOUNTER — APPOINTMENT (OUTPATIENT)
Dept: BARIATRICS/WEIGHT MGMT | Facility: CLINIC | Age: 44
End: 2024-09-27
Payer: COMMERCIAL

## 2024-09-27 DIAGNOSIS — Z30.2 ENCOUNTER FOR STERILIZATION: Chronic | ICD-10-CM

## 2024-09-27 DIAGNOSIS — Z90.13 ACQUIRED ABSENCE OF BILATERAL BREASTS AND NIPPLES: Chronic | ICD-10-CM

## 2024-09-27 DIAGNOSIS — Z98.890 OTHER SPECIFIED POSTPROCEDURAL STATES: Chronic | ICD-10-CM

## 2024-09-27 DIAGNOSIS — E66.9 OBESITY, UNSPECIFIED: ICD-10-CM

## 2024-09-27 DIAGNOSIS — D17.1 BENIGN LIPOMATOUS NEOPLASM OF SKIN AND SUBCUTANEOUS TISSUE OF TRUNK: Chronic | ICD-10-CM

## 2024-09-27 DIAGNOSIS — I10 ESSENTIAL (PRIMARY) HYPERTENSION: ICD-10-CM

## 2024-09-27 PROCEDURE — 99213 OFFICE O/P EST LOW 20 MIN: CPT | Mod: 95

## 2024-09-27 PROCEDURE — G2211 COMPLEX E/M VISIT ADD ON: CPT | Mod: 95

## 2024-09-27 PROCEDURE — G0463: CPT

## 2024-09-27 NOTE — HISTORY OF PRESENT ILLNESS
[FreeTextEntry1] : has sustained  some lifestyle changes since initial appointment   166.5  lbs (30 lb weight loss over past 3.5 months) hieght = 5'4  now on tirzepatide 12.5mg forces herself to eat twice per day increased protein intake constipation resolved with increased fiber intake  otherwise without new complaints today.

## 2024-09-27 NOTE — REASON FOR VISIT
[Home] : at home, [unfilled] , at the time of the visit. [Other Location: e.g. Home (Enter Location, City,State)___] : at [unfilled] [Patient] : the patient [Follow-Up] : a follow-up visit [FreeTextEntry1] : obesity No

## 2024-10-07 DIAGNOSIS — E66.9 OBESITY, UNSPECIFIED: ICD-10-CM

## 2024-10-11 ENCOUNTER — NON-APPOINTMENT (OUTPATIENT)
Age: 44
End: 2024-10-11

## 2024-10-28 ENCOUNTER — APPOINTMENT (OUTPATIENT)
Dept: GASTROENTEROLOGY | Facility: CLINIC | Age: 44
End: 2024-10-28

## 2024-11-27 ENCOUNTER — APPOINTMENT (OUTPATIENT)
Dept: BARIATRICS/WEIGHT MGMT | Facility: CLINIC | Age: 44
End: 2024-11-27
Payer: COMMERCIAL

## 2024-11-27 ENCOUNTER — OUTPATIENT (OUTPATIENT)
Dept: OUTPATIENT SERVICES | Facility: HOSPITAL | Age: 44
LOS: 1 days | End: 2024-11-27
Payer: COMMERCIAL

## 2024-11-27 VITALS — HEIGHT: 64 IN | WEIGHT: 154 LBS | BODY MASS INDEX: 26.29 KG/M2

## 2024-11-27 DIAGNOSIS — D17.1 BENIGN LIPOMATOUS NEOPLASM OF SKIN AND SUBCUTANEOUS TISSUE OF TRUNK: Chronic | ICD-10-CM

## 2024-11-27 DIAGNOSIS — I10 ESSENTIAL (PRIMARY) HYPERTENSION: ICD-10-CM

## 2024-11-27 DIAGNOSIS — Z90.13 ACQUIRED ABSENCE OF BILATERAL BREASTS AND NIPPLES: Chronic | ICD-10-CM

## 2024-11-27 DIAGNOSIS — Z30.2 ENCOUNTER FOR STERILIZATION: Chronic | ICD-10-CM

## 2024-11-27 DIAGNOSIS — E66.812 OBESITY, CLASS 2: ICD-10-CM

## 2024-11-27 DIAGNOSIS — Z98.890 OTHER SPECIFIED POSTPROCEDURAL STATES: Chronic | ICD-10-CM

## 2024-11-27 PROCEDURE — G2211 COMPLEX E/M VISIT ADD ON: CPT | Mod: 95

## 2024-11-27 PROCEDURE — G0463: CPT

## 2024-11-27 PROCEDURE — 99213 OFFICE O/P EST LOW 20 MIN: CPT | Mod: 95

## 2024-12-03 DIAGNOSIS — E66.812 OBESITY, CLASS 2: ICD-10-CM

## 2024-12-05 ENCOUNTER — TRANSCRIPTION ENCOUNTER (OUTPATIENT)
Age: 44
End: 2024-12-05

## 2025-01-06 ENCOUNTER — TRANSCRIPTION ENCOUNTER (OUTPATIENT)
Age: 45
End: 2025-01-06

## 2025-01-21 ENCOUNTER — TRANSCRIPTION ENCOUNTER (OUTPATIENT)
Age: 45
End: 2025-01-21

## 2025-01-24 ENCOUNTER — OUTPATIENT (OUTPATIENT)
Dept: OUTPATIENT SERVICES | Facility: HOSPITAL | Age: 45
LOS: 1 days | End: 2025-01-24

## 2025-01-24 ENCOUNTER — APPOINTMENT (OUTPATIENT)
Dept: BARIATRICS/WEIGHT MGMT | Facility: CLINIC | Age: 45
End: 2025-01-24
Payer: COMMERCIAL

## 2025-01-24 DIAGNOSIS — E66.812 OBESITY, CLASS 2: ICD-10-CM

## 2025-01-24 DIAGNOSIS — I10 ESSENTIAL (PRIMARY) HYPERTENSION: ICD-10-CM

## 2025-01-24 DIAGNOSIS — Z90.13 ACQUIRED ABSENCE OF BILATERAL BREASTS AND NIPPLES: Chronic | ICD-10-CM

## 2025-01-24 DIAGNOSIS — D17.1 BENIGN LIPOMATOUS NEOPLASM OF SKIN AND SUBCUTANEOUS TISSUE OF TRUNK: Chronic | ICD-10-CM

## 2025-01-24 PROCEDURE — 99213 OFFICE O/P EST LOW 20 MIN: CPT | Mod: 95

## 2025-03-10 ENCOUNTER — NON-APPOINTMENT (OUTPATIENT)
Age: 45
End: 2025-03-10

## 2025-03-17 ENCOUNTER — TRANSCRIPTION ENCOUNTER (OUTPATIENT)
Age: 45
End: 2025-03-17

## 2025-03-21 ENCOUNTER — TRANSCRIPTION ENCOUNTER (OUTPATIENT)
Age: 45
End: 2025-03-21

## 2025-04-04 ENCOUNTER — APPOINTMENT (OUTPATIENT)
Dept: BARIATRICS/WEIGHT MGMT | Facility: CLINIC | Age: 45
End: 2025-04-04

## 2025-04-04 ENCOUNTER — OUTPATIENT (OUTPATIENT)
Dept: OUTPATIENT SERVICES | Facility: HOSPITAL | Age: 45
LOS: 1 days | End: 2025-04-04

## 2025-04-04 DIAGNOSIS — Z90.13 ACQUIRED ABSENCE OF BILATERAL BREASTS AND NIPPLES: Chronic | ICD-10-CM

## 2025-04-04 DIAGNOSIS — Z30.2 ENCOUNTER FOR STERILIZATION: Chronic | ICD-10-CM

## 2025-04-04 DIAGNOSIS — D17.1 BENIGN LIPOMATOUS NEOPLASM OF SKIN AND SUBCUTANEOUS TISSUE OF TRUNK: Chronic | ICD-10-CM

## 2025-04-04 DIAGNOSIS — Z98.890 OTHER SPECIFIED POSTPROCEDURAL STATES: Chronic | ICD-10-CM

## 2025-04-04 DIAGNOSIS — I10 ESSENTIAL (PRIMARY) HYPERTENSION: ICD-10-CM

## 2025-04-04 PROCEDURE — 99213 OFFICE O/P EST LOW 20 MIN: CPT | Mod: 93

## 2025-04-24 ENCOUNTER — NON-APPOINTMENT (OUTPATIENT)
Age: 45
End: 2025-04-24

## 2025-05-23 ENCOUNTER — APPOINTMENT (OUTPATIENT)
Dept: BARIATRICS/WEIGHT MGMT | Facility: CLINIC | Age: 45
End: 2025-05-23
Payer: COMMERCIAL

## 2025-05-23 ENCOUNTER — OUTPATIENT (OUTPATIENT)
Dept: OUTPATIENT SERVICES | Facility: HOSPITAL | Age: 45
LOS: 1 days | End: 2025-05-23

## 2025-05-23 DIAGNOSIS — I10 ESSENTIAL (PRIMARY) HYPERTENSION: ICD-10-CM

## 2025-05-23 DIAGNOSIS — Z90.13 ACQUIRED ABSENCE OF BILATERAL BREASTS AND NIPPLES: Chronic | ICD-10-CM

## 2025-05-23 DIAGNOSIS — Z30.2 ENCOUNTER FOR STERILIZATION: Chronic | ICD-10-CM

## 2025-05-23 DIAGNOSIS — Z98.890 OTHER SPECIFIED POSTPROCEDURAL STATES: Chronic | ICD-10-CM

## 2025-05-23 DIAGNOSIS — D17.1 BENIGN LIPOMATOUS NEOPLASM OF SKIN AND SUBCUTANEOUS TISSUE OF TRUNK: Chronic | ICD-10-CM

## 2025-05-23 DIAGNOSIS — E66.812 OBESITY, CLASS 2: ICD-10-CM

## 2025-05-23 PROCEDURE — G2211 COMPLEX E/M VISIT ADD ON: CPT | Mod: 95

## 2025-05-23 PROCEDURE — 99213 OFFICE O/P EST LOW 20 MIN: CPT | Mod: 95

## 2025-05-23 RX ORDER — ONDANSETRON 4 MG/1
4 TABLET ORAL
Qty: 30 | Refills: 1 | Status: ACTIVE | COMMUNITY
Start: 2025-05-23 | End: 1900-01-01

## 2025-07-25 ENCOUNTER — NON-APPOINTMENT (OUTPATIENT)
Age: 45
End: 2025-07-25

## 2025-07-25 ENCOUNTER — APPOINTMENT (OUTPATIENT)
Dept: OBGYN | Facility: CLINIC | Age: 45
End: 2025-07-25
Payer: COMMERCIAL

## 2025-07-25 VITALS
HEIGHT: 64 IN | BODY MASS INDEX: 25.1 KG/M2 | SYSTOLIC BLOOD PRESSURE: 116 MMHG | WEIGHT: 147 LBS | DIASTOLIC BLOOD PRESSURE: 80 MMHG

## 2025-07-25 DIAGNOSIS — Z12.39 ENCOUNTER FOR OTHER SCREENING FOR MALIGNANT NEOPLASM OF BREAST: ICD-10-CM

## 2025-07-25 DIAGNOSIS — R92.30 DENSE BREASTS, UNSPECIFIED: ICD-10-CM

## 2025-07-25 DIAGNOSIS — Z12.4 ENCOUNTER FOR SCREENING FOR MALIGNANT NEOPLASM OF CERVIX: ICD-10-CM

## 2025-07-25 DIAGNOSIS — Z11.51 ENCOUNTER FOR SCREENING FOR HUMAN PAPILLOMAVIRUS (HPV): ICD-10-CM

## 2025-07-25 DIAGNOSIS — Z11.3 ENCOUNTER FOR SCREENING FOR INFECTIONS WITH A PREDOMINANTLY SEXUAL MODE OF TRANSMISSION: ICD-10-CM

## 2025-07-25 PROCEDURE — 99386 PREV VISIT NEW AGE 40-64: CPT

## 2025-07-25 PROCEDURE — 36415 COLL VENOUS BLD VENIPUNCTURE: CPT

## 2025-07-28 LAB
C TRACH RRNA SPEC QL NAA+PROBE: NOT DETECTED
HBV SURFACE AG SER QL: NONREACTIVE
HCV AB SER QL: NONREACTIVE
HCV S/CO RATIO: 0.15 S/CO
HIV1+2 AB SPEC QL IA.RAPID: NONREACTIVE
HPV HIGH+LOW RISK DNA PNL CVX: NOT DETECTED
N GONORRHOEA RRNA SPEC QL NAA+PROBE: NOT DETECTED
SOURCE TP AMPLIFICATION: NORMAL
T PALLIDUM AB SER QL IA: NEGATIVE

## 2025-07-31 LAB — CYTOLOGY CVX/VAG DOC THIN PREP: NORMAL

## 2025-08-08 ENCOUNTER — OUTPATIENT (OUTPATIENT)
Dept: OUTPATIENT SERVICES | Facility: HOSPITAL | Age: 45
LOS: 1 days | End: 2025-08-08

## 2025-08-08 ENCOUNTER — APPOINTMENT (OUTPATIENT)
Dept: BARIATRICS/WEIGHT MGMT | Facility: CLINIC | Age: 45
End: 2025-08-08
Payer: COMMERCIAL

## 2025-08-08 DIAGNOSIS — E66.812 OBESITY, CLASS 2: ICD-10-CM

## 2025-08-08 DIAGNOSIS — Z98.890 OTHER SPECIFIED POSTPROCEDURAL STATES: Chronic | ICD-10-CM

## 2025-08-08 DIAGNOSIS — D17.1 BENIGN LIPOMATOUS NEOPLASM OF SKIN AND SUBCUTANEOUS TISSUE OF TRUNK: Chronic | ICD-10-CM

## 2025-08-08 DIAGNOSIS — Z90.13 ACQUIRED ABSENCE OF BILATERAL BREASTS AND NIPPLES: Chronic | ICD-10-CM

## 2025-08-08 DIAGNOSIS — Z30.2 ENCOUNTER FOR STERILIZATION: Chronic | ICD-10-CM

## 2025-08-08 PROCEDURE — G2211 COMPLEX E/M VISIT ADD ON: CPT | Mod: 95

## 2025-08-08 PROCEDURE — 99213 OFFICE O/P EST LOW 20 MIN: CPT | Mod: 95

## 2025-08-11 DIAGNOSIS — I10 ESSENTIAL (PRIMARY) HYPERTENSION: ICD-10-CM
